# Patient Record
Sex: MALE | Race: WHITE | NOT HISPANIC OR LATINO | Employment: FULL TIME | URBAN - METROPOLITAN AREA
[De-identification: names, ages, dates, MRNs, and addresses within clinical notes are randomized per-mention and may not be internally consistent; named-entity substitution may affect disease eponyms.]

---

## 2017-03-29 ENCOUNTER — OFFICE VISIT (OUTPATIENT)
Dept: NEUROLOGY | Facility: CLINIC | Age: 52
End: 2017-03-29

## 2017-03-29 VITALS
DIASTOLIC BLOOD PRESSURE: 72 MMHG | OXYGEN SATURATION: 99 % | SYSTOLIC BLOOD PRESSURE: 128 MMHG | WEIGHT: 183 LBS | HEIGHT: 69 IN | RESPIRATION RATE: 18 BRPM | BODY MASS INDEX: 27.11 KG/M2

## 2017-03-29 DIAGNOSIS — G40.219 PARTIAL SYMPTOMATIC EPILEPSY WITH COMPLEX PARTIAL SEIZURES, INTRACTABLE, WITHOUT STATUS EPILEPTICUS (HCC): Primary | ICD-10-CM

## 2017-03-29 DIAGNOSIS — F32.A DEPRESSION, UNSPECIFIED DEPRESSION TYPE: ICD-10-CM

## 2017-03-29 PROCEDURE — 95974 PR COMPLEX CRANIAL NEUROSTIM,1ST HOUR: CPT | Performed by: PSYCHIATRY & NEUROLOGY

## 2017-03-29 PROCEDURE — 99214 OFFICE O/P EST MOD 30 MIN: CPT | Performed by: PSYCHIATRY & NEUROLOGY

## 2017-03-29 RX ORDER — ESCITALOPRAM OXALATE 5 MG/1
TABLET ORAL
Qty: 60 TABLET | Refills: 5 | Status: SHIPPED | OUTPATIENT
Start: 2017-03-29 | End: 2018-01-09 | Stop reason: SDUPTHER

## 2017-03-29 NOTE — PROGRESS NOTES
Subjective   Servando Estrada is a 51 y.o. male.     History of Present Illness   Patient followed for several years for seizures starting at about the age of 12. He has had evaluation by Dr. Klein at  including EEG in 2004 showing bitemporal focal slow wave activity more prominent on the left. VEEG showed probable frontal lobe onset. Seizures start with paranoid feeling, then everything sounds loud, then sometimes body movement. Aware with some and not others. Recalls one recently pushed back against couch, and right arm extended up in air. Usually arms or trunk or whole body movement; used to grit teeth. Has some where whole body tenses up, exhausted afterwards. Many in sleep. ?Ever GTC.     Also has common migraines.  Has used PHB, PHT, Tegretol-XR and generic carbamazepine XR. Continues to have seizures despite treatment. He has not tolerated TPM or LVT.  September 2013 started LTG XR to total dose 400mg am. Reported no side effects on LTG ER but prefers taking LTG 200mg bid and LTG ER 50mg 4 qam.  Reported best month probably 5-6 seizures while awake (more in sleep), in a bad month -- many.       April 2014, seizures mainly in sleep, almost every single day. Has been reluctant to try other meds.   model placed 5/7/15. Noted no benefit, so February 2016 planned rapid cycling and slight increase duty cycle: changed off time of 1.1min, on time to 7 seconds. 9/16 reported: Past couple months just total of 2-3 while awake (estimates 30% better), but many at night still. Still noting prolonged sharp pain in his neck with using the magnet. Can still hear hoarseness with stimulation but cannot feel it.  Further increased VNS duty cycle by decreasing off time to 0.8 minutes, no change on time of 7 seconds or current of 1.5mamp,  12/6/16: overall has been doing well, but had one yesterday on way to car. [place] paranoid, then another about 2 hours later, and a few other milder ones, more like aura (no alteration  of consciousness). Was tired, a little sleep deprived, but not ill.   Using magnet is still very painful, finds himself avoiding using it.  Still having problems getting medications from his pharmacy on time even when he orders medication 3 days before he runs out.   Decreased the pulse width of the magnet setting to 250 and he tolerated that well in the office with just some cough but no discomfort. We increased the duty cycle further by decreasing off time to 0.5 minutes.  Today reports seizures at night, maybe stress induced and lack of sleep. More mood swings, depressed and irritable, not severe, no thoughts of self harm. Trouble with judging distance/depth perception; present for several years, but has bumped head twice in past couple weeks. Lots of stress. Work, and worried about partner's health. Has had 2-3 daytime seizures since last seen. `Magnet more painful again, hardly uses 2/2 pain shooting up into neck.    The following portions of the patient's history were reviewed and updated as appropriate: allergies, current medications, past family history, past medical history, past social history, past surgical history and problem list.    Review of Systems   Constitutional: Negative for fever and unexpected weight change.   HENT: Positive for voice change.    Respiratory: Negative for cough, choking and shortness of breath.    Cardiovascular: Negative for chest pain.   Neurological: Positive for seizures.       Objective   Physical Exam   Constitutional: He is oriented to person, place, and time. He appears well-developed and well-nourished.   HENT:   Head: Normocephalic and atraumatic.   Eyes: EOM are normal. Pupils are equal, round, and reactive to light.   Pulmonary/Chest: Effort normal.   Musculoskeletal: Normal range of motion.   Neurological: He is oriented to person, place, and time. He has a normal Finger-Nose-Finger Test and a normal Heel to Shin Test. Gait normal.   Skin: Skin is warm and dry.    Psychiatric: His speech is normal and behavior is normal. Judgment and thought content normal.   Nursing note and vitals reviewed.      Neurologic Exam     Mental Status   Oriented to person, place, and time.   Speech: speech is normal   Level of consciousness: alert  Able to name object. Able to read. Able to write. Normal comprehension.     Cranial Nerves     CN II   Visual fields full to confrontation.     CN III, IV, VI   Pupils are equal, round, and reactive to light.  Extraocular motions are normal.     CN V   Facial sensation intact.     CN IX, X   CN IX normal.     CN XI   CN XI normal.     CN XII   CN XII normal.     Motor Exam   Muscle bulk: normal  Right arm pronator drift: absent  Left arm pronator drift: absent    Gait, Coordination, and Reflexes     Gait  Gait: normal    Coordination   Finger to nose coordination: normal  Heel to shin coordination: normal    Tremor   Resting tremor: absent  Intention tremor: absent  Action tremor: absent      Assessment/Plan   Servando was seen today for seizures.    Diagnoses and all orders for this visit:    Partial symptomatic epilepsy with complex partial seizures, intractable, without status epilepticus    Depression, unspecified depression type    Other orders  -     escitalopram (LEXAPRO) 5 MG tablet; Start one tab daily, and after 3 weeks, increase to 2 tabs if tolerated.    Discussion/Summary:  Not tolerating magnet; VNS settings reviewed, device diagnostics checked. Decreased magnet on time to 21 seconds -- he will try to start swiping twice daily to improve tolerance. We will look into insurance coverage for change to 106, with which we would likely be able to get better effect with lower current settings. Copay could be an issue, needed loan for the first surgery.  For depression, will start lexapro 5mg, up to 10mg. Discussed possible worsening of depression, stop immediately if that occurs; partner also aware.  Return in about 3 months (around 6/29/2017).

## 2017-06-28 ENCOUNTER — OFFICE VISIT (OUTPATIENT)
Dept: NEUROLOGY | Facility: CLINIC | Age: 52
End: 2017-06-28

## 2017-06-28 VITALS
HEIGHT: 69 IN | RESPIRATION RATE: 12 BRPM | WEIGHT: 183 LBS | BODY MASS INDEX: 27.11 KG/M2 | DIASTOLIC BLOOD PRESSURE: 76 MMHG | SYSTOLIC BLOOD PRESSURE: 124 MMHG

## 2017-06-28 DIAGNOSIS — G40.219 PARTIAL SYMPTOMATIC EPILEPSY WITH COMPLEX PARTIAL SEIZURES, INTRACTABLE, WITHOUT STATUS EPILEPTICUS (HCC): Primary | ICD-10-CM

## 2017-06-28 DIAGNOSIS — G43.709 CHRONIC MIGRAINE WITHOUT AURA WITHOUT STATUS MIGRAINOSUS, NOT INTRACTABLE: ICD-10-CM

## 2017-06-28 PROCEDURE — 95970 ALYS NPGT W/O PRGRMG: CPT | Performed by: PSYCHIATRY & NEUROLOGY

## 2017-06-28 PROCEDURE — 99214 OFFICE O/P EST MOD 30 MIN: CPT | Performed by: PSYCHIATRY & NEUROLOGY

## 2017-06-28 RX ORDER — SUMATRIPTAN 100 MG/1
100 TABLET, FILM COATED ORAL ONCE AS NEEDED
Qty: 9 TABLET | Refills: 11 | Status: SHIPPED | OUTPATIENT
Start: 2017-06-28 | End: 2018-01-09 | Stop reason: SDUPTHER

## 2017-06-28 NOTE — PROGRESS NOTES
Subjective   Servando Estrada is a 51 y.o. male.     History of Present Illness   Patient followed for several years for seizures starting at about the age of 12. He has had evaluation by Dr. Klein at  including EEG in 2004 showing bitemporal focal slow wave activity more prominent on the left. VEEG showed probable frontal lobe onset. Seizures start with paranoid feeling, then everything sounds loud, then sometimes body movement. Aware with some and not others. Recalls one where pushed back against couch, and right arm extended up in air. Usually arms or trunk or whole body movement; used to grit teeth. Has some where whole body tenses up, exhausted afterwards. Many in sleep. ?Ever GTC.      Also has common migraines.  Has used PHB, PHT, Tegretol-XR and generic carbamazepine XR. Continues to have seizures despite treatment. He has not tolerated TPM or LVT.  September 2013 started LTG XR to total dose 400mg am. Reported no side effects on LTG ER but prefers taking LTG 200mg bid and LTG ER 50mg 4 qam.  Reported best month probably 5-6 seizures while awake (more in sleep), in a bad month -- many.    Has been reluctant to try other meds.   model placed 5/7/15. Noted no benefit, so February 2016 planned rapid cycling and slight increase duty cycle: changed off time of 1.1min, on time to 7 seconds.   9/16 reported: Past couple months just total of 2-3 while awake (estimates 30% better), but many at night still. Still noting prolonged sharp pain in his neck with using the magnet. Can still hear hoarseness with stimulation but cannot feel it.  Further increased VNS duty cycle by decreasing off time to 0.8 minutes, no change on time of 7 seconds or current of 1.5mamp,  Using magnet is still very painful, finds himself avoiding using it.  Decreased the pulse width of the magnet setting to 250 and he tolerated that well in the office with just some cough but no discomfort. We increased the duty cycle further by  decreasing off time to 0.5 minutes.  3/17: reports seizures at night, maybe stress induced and lack of sleep. More mood swings, depressed and irritable, not severe, no thoughts of self harm. Trouble with judging distance/depth perception; present for several years, but has bumped head twice in past couple weeks. Lots of stress. 2-3 daytime seizures since last seen. Magnet more painful again, hardly uses 2/2 pain shooting up into neck. Decreased magnet on time to 21 seconds -- he will try to start swiping twice daily to improve tolerance. We will look into insurance coverage for change to 106, but copay could be an issue, needed loan for the first surgery.  For depression, will start lexapro 5mg, up to 10mg.  Today: having sporadic daytime seizures, usually when tired. Severity improved for the infrequent seizures -- has not had to leave work or miss work due to seizures. Had the flu couple months ago. Recent toradol for back, and muscle relaxers. Really not using magnet (one day in April, once 2 weeks ago per device). Tried in the office today, mild pain near jaw, tolerable.   Not taking lexapro, mood OK, feeling better with partner's health better.     The following portions of the patient's history were reviewed and updated as appropriate: allergies, current medications, past family history, past medical history, past social history, past surgical history and problem list.    Review of Systems   Constitutional: Negative for unexpected weight change.   Respiratory: Negative for cough and shortness of breath.    Cardiovascular: Negative for chest pain.   Musculoskeletal: Positive for back pain.   Neurological: Positive for seizures.       Objective   Physical Exam   Constitutional: He is oriented to person, place, and time. He appears well-developed and well-nourished.   HENT:   Head: Normocephalic and atraumatic.   Eyes: EOM are normal. Pupils are equal, round, and reactive to light.   Pulmonary/Chest: Effort  normal.   Musculoskeletal: Normal range of motion.   Neurological: He is oriented to person, place, and time. He has a normal Finger-Nose-Finger Test and a normal Heel to Shin Test. Gait normal.   Skin: Skin is warm and dry.   Psychiatric: His speech is normal.   Nursing note and vitals reviewed.      Neurologic Exam     Mental Status   Oriented to person, place, and time.   Speech: speech is normal   Level of consciousness: alert  Knowledge: consistent with education.   Able to name object. Normal comprehension.     Cranial Nerves     CN II   Visual fields full to confrontation.     CN III, IV, VI   Pupils are equal, round, and reactive to light.  Extraocular motions are normal.     CN VII   Facial expression full, symmetric.     CN IX, X   CN IX normal.   CN X normal.   Palate: symmetric    CN XI   CN XI normal.     CN XII   CN XII normal.     Motor Exam   Muscle bulk: normal  Right arm pronator drift: absent  Left arm pronator drift: absent    Strength   Strength 5/5 except as noted.     Sensory Exam   Light touch normal.     Gait, Coordination, and Reflexes     Gait  Gait: normal    Coordination   Finger to nose coordination: normal  Heel to shin coordination: normal    Tremor   Resting tremor: absent  Intention tremor: absent  Action tremor: absent      Assessment/Plan   Servando was seen today for follow-up.    Diagnoses and all orders for this visit:    Partial symptomatic epilepsy with complex partial seizures, intractable, without status epilepticus    Chronic migraine without aura without status migrainosus, not intractable    Other orders  -     SUMAtriptan (IMITREX) 100 MG tablet; Take 1 tablet by mouth 1 (One) Time As Needed for Migraine for up to 1 dose.      Discussion/Summary:  Sz: overall marked improvement with VNS. Would like to be able to abort his now infrequent daytime seizures, since usually gets a warning. Magnet tolerable in office today, so he is agreeable to start using at least once/day.  Next visit plan to increase on time of magnet. Device diagnostics look good, lead impedance normal, battery still all green. Magnet use as above.  Migraine -- refills given of imitrex. Discussed restarting prophylactic medication if remain frequent. Exam benign and no change in character.  25 minutes face to face, 15 minutes spent in discussion of VNS efficacy, setting options, tolerance of magnet, migraine tx.  Return in about 3 months (around 9/28/2017).

## 2017-10-05 ENCOUNTER — OFFICE VISIT (OUTPATIENT)
Dept: NEUROLOGY | Facility: CLINIC | Age: 52
End: 2017-10-05

## 2017-10-05 VITALS
DIASTOLIC BLOOD PRESSURE: 82 MMHG | SYSTOLIC BLOOD PRESSURE: 126 MMHG | HEIGHT: 69 IN | BODY MASS INDEX: 26.07 KG/M2 | WEIGHT: 176 LBS

## 2017-10-05 DIAGNOSIS — G40.219 PARTIAL SYMPTOMATIC EPILEPSY WITH COMPLEX PARTIAL SEIZURES, INTRACTABLE, WITHOUT STATUS EPILEPTICUS (HCC): Primary | ICD-10-CM

## 2017-10-05 PROCEDURE — 95974 PR COMPLEX CRANIAL NEUROSTIM,1ST HOUR: CPT | Performed by: PSYCHIATRY & NEUROLOGY

## 2017-10-05 NOTE — PROGRESS NOTES
Subjective   Servando Estrada is a 52 y.o. male.     History of Present Illness   Patient followed for several years for seizures starting at about the age of 12. Has had evaluation by Dr. Klein at  including EEG in 2004 showing bitemporal focal slow wave activity more prominent on the left. VEEG showed probable frontal lobe onset. Seizures start with paranoid feeling, then everything sounds loud, then sometimes body movement. Aware with some and not others. Recalls one where pushed back against couch, and right arm extended up in air. Usually arms or trunk or whole body movement; used to grit teeth. Has some where whole body tenses up, exhausted afterwards. Many in sleep. ?Ever GTC.      Also has common migraines.  Has used PHB, PHT, Tegretol-XR and generic carbamazepine XR. Continues to have seizures despite treatment. He has not tolerated TPM or LVT.  September 2013 started LTG XR to total dose 400mg am. Reported no side effects on LTG ER but prefers taking LTG 200mg bid and LTG ER 50mg 4 qam.  Reported best month probably 5-6 seizures while awake (more in sleep), in a bad month -- many.    Has been reluctant to try other meds.   model placed 5/7/15. Noted no benefit, so February 2016 planned rapid cycling and slight increase duty cycle: changed off time of 1.1min, on time to 7 seconds.   9/16 reported: Past couple months just total of 2-3 while awake (estimates 30% better), but many at night still. Still noting prolonged sharp pain in his neck with using the magnet. Can still hear hoarseness with stimulation but cannot feel it.  Further increased VNS duty cycle by decreasing off time to 0.8 minutes, no change on time of 7 seconds or current of 1.5mamp,  Using magnet is still very painful, finds himself avoiding using it.  Decreased the pulse width of the magnet setting to 250 and he tolerated that well in the office with just some cough but no discomfort. We increased the duty cycle further by  decreasing off time to 0.5 minutes.  3/17: reports seizures at night, maybe stress induced and lack of sleep. More mood problems, depression, stress. 2-3 daytime seizures since last seen. Magnet more painful again, hardly uses 2/2 pain shooting up into neck. Decreased magnet on time to 21 seconds -- he will try to start swiping twice daily to improve tolerance. We will look into insurance coverage for change to 106, but copay could be an issue, needed loan for the first surgery.  For depression, planned lexapro 5mg, up to 10mg.  6/17: having sporadic daytime seizures, usually when tired. Severity improved for the infrequent seizures -- has not had to leave work or miss work due to seizures.Really not using magnet (one day in April, once 2 weeks ago per device). Tried in the office today, mild pain near jaw, tolerable. Planned to try to habituate.  Not taking lexapro, mood OK, feeling better with partner's health better.   Today: still a painful twinge with magnet, so hasn't used much (several times soon after last seen, none in past 6 weeks). Seizures pretty good overall -- 2-3 daytime sz in over 3 months, not many nocturnal either. Not leaving work 2/2 sz. Same triggers -- eg fatigue.    The following portions of the patient's history were reviewed and updated as appropriate: allergies, current medications, past family history, past medical history, past social history, past surgical history and problem list.    Review of Systems   Constitutional: Negative for fever and unexpected weight change.   Respiratory: Negative for cough and shortness of breath.    Cardiovascular: Negative for chest pain.       Objective   Physical Exam   Constitutional: He is oriented to person, place, and time. He appears well-developed and well-nourished.   HENT:   Head: Normocephalic and atraumatic.   Eyes: EOM are normal. Pupils are equal, round, and reactive to light.   Pulmonary/Chest: Effort normal.   Musculoskeletal: Normal range  "of motion.   Neurological: He is oriented to person, place, and time. He has normal strength. Gait normal.   Skin: Skin is warm and dry.   Nursing note and vitals reviewed.      Neurologic Exam     Mental Status   Oriented to person, place, and time.   Attention: normal.   Level of consciousness: alert  Knowledge: good.   Able to name object. Able to read. Able to repeat. Able to write. Normal comprehension.     Cranial Nerves     CN III, IV, VI   Pupils are equal, round, and reactive to light.  Extraocular motions are normal.     CN VII   Facial expression full, symmetric.     CN IX, X   CN IX normal.   CN X normal.     CN XI   CN XI normal.     CN XII   CN XII normal.     Motor Exam   Muscle bulk: normal  Overall muscle tone: normal    Strength   Strength 5/5 throughout.     Gait, Coordination, and Reflexes     Gait  Gait: normal    Tremor   Resting tremor: absent  Intention tremor: absent  Action tremor: absent      Assessment/Plan   Servando was seen today for neurologic problem.    Diagnoses and all orders for this visit:    Partial symptomatic epilepsy with complex partial seizures, intractable, without status epilepticus    Discussion/Summary:  VNS programmin seconds causes painful twinge in neck with magnet at , so decreased to 130, increased magnet on time back to 60 seconds. No trouble whatsoever. Device diagnostics look good, levels recorded. No change in normal mode (1.5m amp, , on 7 sec off 0.8 minutes), battery full green.  Overall doing well with seizures with VNS, but no \"rescue\" available from magnet until now due to hesitation to use it. Completely tolerable now, so gave extra magnet and discussed ways to remember to use it, at least twice/day if possible, so we can increase PW next visit into more effective range.  Return in about 4 weeks (around 2017).  "

## 2018-01-09 RX ORDER — LAMOTRIGINE 50 MG/1
TABLET, EXTENDED RELEASE ORAL
Qty: 135 TABLET | Refills: 11 | Status: SHIPPED | OUTPATIENT
Start: 2018-01-09 | End: 2018-01-24 | Stop reason: SDUPTHER

## 2018-01-09 RX ORDER — LAMOTRIGINE 200 MG/1
TABLET ORAL
Qty: 75 TABLET | Refills: 11 | Status: SHIPPED | OUTPATIENT
Start: 2018-01-09 | End: 2018-01-24 | Stop reason: SDUPTHER

## 2018-01-09 RX ORDER — ESCITALOPRAM OXALATE 5 MG/1
TABLET ORAL
Qty: 60 TABLET | Refills: 5 | Status: SHIPPED | OUTPATIENT
Start: 2018-01-09 | End: 2018-05-24

## 2018-01-09 RX ORDER — SUMATRIPTAN 100 MG/1
100 TABLET, FILM COATED ORAL ONCE AS NEEDED
Qty: 9 TABLET | Refills: 11 | Status: SHIPPED | OUTPATIENT
Start: 2018-01-09

## 2018-01-09 RX ORDER — CARBAMAZEPINE 200 MG/1
TABLET, EXTENDED RELEASE ORAL
Qty: 135 TABLET | Refills: 11 | Status: SHIPPED | OUTPATIENT
Start: 2018-01-09 | End: 2018-01-24 | Stop reason: SDUPTHER

## 2018-01-24 ENCOUNTER — OFFICE VISIT (OUTPATIENT)
Dept: NEUROLOGY | Facility: CLINIC | Age: 53
End: 2018-01-24

## 2018-01-24 VITALS
SYSTOLIC BLOOD PRESSURE: 118 MMHG | DIASTOLIC BLOOD PRESSURE: 66 MMHG | HEIGHT: 69 IN | BODY MASS INDEX: 25.92 KG/M2 | WEIGHT: 175 LBS

## 2018-01-24 DIAGNOSIS — G40.219 PARTIAL SYMPTOMATIC EPILEPSY WITH COMPLEX PARTIAL SEIZURES, INTRACTABLE, WITHOUT STATUS EPILEPTICUS (HCC): Primary | ICD-10-CM

## 2018-01-24 PROCEDURE — 99213 OFFICE O/P EST LOW 20 MIN: CPT | Performed by: PSYCHIATRY & NEUROLOGY

## 2018-01-24 PROCEDURE — 95974 PR COMPLEX CRANIAL NEUROSTIM,1ST HOUR: CPT | Performed by: PSYCHIATRY & NEUROLOGY

## 2018-01-24 RX ORDER — LAMOTRIGINE 50 MG/1
TABLET, EXTENDED RELEASE ORAL
Qty: 150 TABLET | Refills: 11 | Status: SHIPPED | OUTPATIENT
Start: 2018-01-24 | End: 2019-01-10 | Stop reason: SDUPTHER

## 2018-01-24 RX ORDER — IBUPROFEN 600 MG/1
600 TABLET ORAL EVERY 6 HOURS PRN
COMMUNITY

## 2018-01-24 RX ORDER — CARBAMAZEPINE 200 MG/1
TABLET, EXTENDED RELEASE ORAL
Qty: 150 TABLET | Refills: 11 | Status: SHIPPED | OUTPATIENT
Start: 2018-01-24 | End: 2019-02-21 | Stop reason: SDUPTHER

## 2018-01-24 RX ORDER — LAMOTRIGINE 200 MG/1
TABLET ORAL
Qty: 80 TABLET | Refills: 11 | Status: SHIPPED | OUTPATIENT
Start: 2018-01-24 | End: 2018-05-24 | Stop reason: SDUPTHER

## 2018-01-24 NOTE — PROGRESS NOTES
Subjective   Servando Estrada is a 52 y.o. male.     Chief Complaint   Patient presents with   • Follow-up     no complaints       History of Present Illness   Patient followed for several years for epilepsy starting around age 12. Has had evaluation by Dr. Klein at  including EEG in 2004 showing bitemporal focal slow wave activity more prominent on the left. VEEG showed probable frontal lobe onset. Seizures start with paranoid feeling, then everything sounds loud, then sometimes body movement. Aware with some and not others. Recalls one where pushed back against couch, and right arm extended up in air. Usually arms or trunk or whole body movement; used to grit teeth. Has some where whole body tenses up, exhausted afterwards. Many in sleep. ?Ever GTC.      Also has common migraines.  Has used PHB, PHT, Tegretol-XR and generic carbamazepine XR, has not tolerated TPM or LVT.  9/13 started LTG XR to total dose 400mg am. Reported no side effects on LTG ER but prefers taking LTG 200mg bid and LTG ER 50mg 4 qam.  Reported best month probably 5-6 seizures while awake (more in sleep), in a bad month -- many.     model placed 5/15. Noted no benefit, so 2/16 changed to rapid cycling, off time 1.1min, on time to 7 seconds.   9/16 reported: Past couple months just total of 2-3 while awake (estimates 30% better), but many at night still. Still noting prolonged sharp pain in his neck with using the magnet.  Further increased VNS duty cycle by decreasing off time to 0.8 minutes, no change on time of 7 seconds or current of 1.5mamp,  Using magnet is still very painful, avoids using it.  Decreased the pulse width of the magnet setting to 250 and he tolerated that well in the office with just some cough but no discomfort. We increased the duty cycle further by decreasing off time to 0.5 minutes.  3/17: reports seizures at night, 2-3 daytime seizures since last seen. Magnet more painful again, decreased magnet on time to  21 seconds.  For depression, planned lexapro.  6/17: having sporadic daytime seizures, usually when tired. Severity improved for the infrequent seizures -- has not had to leave work or miss work due to seizures. Not taking lexapro, mood OK, feeling better with partner's health better.   10/17: Seizures pretty good overall -- 2-3 daytime sz in over 3 months, not many nocturnal either. Not leaving work 2/2 sz. Same triggers -- eg fatigue.  21 seconds causes painful twinge in neck with magnet at , so decreased to 130, increased magnet on time back to 60 seconds. Normal mode 1.5m amp, , on 7 sec off 0.8 minutes, battery full green.  Today: using magnet, seems to help.    Probably doing better. Had 2 seizures because pharmacy didn't have his meds, actually ran out of medication, was tired, and had a couple. Has tried going in person, phoning, using online system and cannot consistently get his refills. Health has been ok otherwise.    The following portions of the patient's history were reviewed and updated as appropriate: allergies, current medications, past family history, past medical history, past social history, past surgical history and problem list.    No Known Allergies    Current Outpatient Prescriptions on File Prior to Visit   Medication Sig Dispense Refill   • escitalopram (LEXAPRO) 5 MG tablet Start one tab daily, and after 3 weeks, increase to 2 tabs if tolerated. 60 tablet 5   • SUMAtriptan (IMITREX) 100 MG tablet Take 1 tablet by mouth 1 (One) Time As Needed for Migraine for up to 1 dose. 9 tablet 11   • [DISCONTINUED] carBAMazepine XR (TEGretol  XR) 200 MG 12 hr tablet Take two pills twice daily, may take extra pill as instructed 135 tablet 11   • [DISCONTINUED] lamoTRIgine (LaMICtal) 200 MG tablet Take one pill twice daily, may take extra pill as instructed. 75 tablet 11   • [DISCONTINUED] LamoTRIgine ER 50 MG tablet sustained-release 24 hour Take 4 tabs daily each morning in addition to  "regular lamotrigine, may take extra pill as instructed. 135 tablet 11     No current facility-administered medications on file prior to visit.        Past Medical History:   Diagnosis Date   • Complex partial seizure    • Headache, migraine        No past surgical history on file.    Social History     Social History   • Marital status: Single     Spouse name: N/A   • Number of children: N/A   • Years of education: N/A     Occupational History   • Not on file.     Social History Main Topics   • Smoking status: Current Every Day Smoker   • Smokeless tobacco: Not on file   • Alcohol use No   • Drug use: Not on file   • Sexual activity: Not on file     Other Topics Concern   • Not on file     Social History Narrative       Review of Systems   Constitutional: Negative for fever and unexpected weight change.   Respiratory: Negative for cough and shortness of breath.    Cardiovascular: Negative for chest pain.       Objective   Blood pressure 118/66, height 175.3 cm (69\"), weight 79.4 kg (175 lb).    Physical Exam   Constitutional: He is oriented to person, place, and time. He appears well-developed and well-nourished.   HENT:   Head: Normocephalic and atraumatic.   Eyes: EOM are normal.   Pulmonary/Chest: Effort normal.   Musculoskeletal: Normal range of motion.   Neurological: He is oriented to person, place, and time. He has normal strength. Gait normal.   Skin: Skin is warm and dry.   Psychiatric: His speech is normal.   Nursing note and vitals reviewed.      Neurologic Exam     Mental Status   Oriented to person, place, and time.   Speech: speech is normal   Level of consciousness: alert    Cranial Nerves     CN III, IV, VI   Extraocular motions are normal.     CN VII   Facial expression full, symmetric.     CN IX, X   CN IX normal.     CN XI   CN XI normal.     CN XII   CN XII normal.     Motor Exam     Strength   Strength 5/5 throughout.     Gait, Coordination, and Reflexes     Gait  Gait: " normal      Assessment/Plan     Servando was seen today for follow-up.    Diagnoses and all orders for this visit:    Partial symptomatic epilepsy with complex partial seizures, intractable, without status epilepticus    Other orders  -     carBAMazepine XR (TEGretol  XR) 200 MG 12 hr tablet; Take two pills twice daily, may take extra pill as instructed  -     lamoTRIgine (LaMICtal) 200 MG tablet; Take one pill twice daily, may take extra pill as instructed.  -     LamoTRIgine ER 50 MG tablet sustained-release 24 hour; Take 4 tabs daily each morning in addition to regular lamotrigine, may take extra pill as instructed.        Discussion/Summary:  Long discussion today regarding magnet use, differences between the 105 and 106 models, prognosis with VNS over time, including effect not just on seizures but postictal. etc., medications, and made plan for extra pills for a buffer when he his pharmacy does not have his medications.  We tried increase the magnet pulse width back to 250, and he initially felt this was tolerable but after some minutes coughing was too much and we decreased it back to 130.  Device diagnostics looked good, battery full green, impedance okay and leads looked good.  30 minutes face-to-face, 20 minutes spent in discussion as above.  Return in about 4 months (around 5/24/2018).    There are no Patient Instructions on file for this visit.

## 2018-02-12 RX ORDER — LAMOTRIGINE 200 MG/1
TABLET ORAL
Qty: 75 TABLET | Refills: 11 | Status: SHIPPED | OUTPATIENT
Start: 2018-02-12 | End: 2020-03-23 | Stop reason: SDUPTHER

## 2018-05-24 ENCOUNTER — OFFICE VISIT (OUTPATIENT)
Dept: NEUROLOGY | Facility: CLINIC | Age: 53
End: 2018-05-24

## 2018-05-24 VITALS
HEIGHT: 69 IN | WEIGHT: 178 LBS | SYSTOLIC BLOOD PRESSURE: 118 MMHG | DIASTOLIC BLOOD PRESSURE: 72 MMHG | BODY MASS INDEX: 26.36 KG/M2

## 2018-05-24 DIAGNOSIS — G40.219 PARTIAL SYMPTOMATIC EPILEPSY WITH COMPLEX PARTIAL SEIZURES, INTRACTABLE, WITHOUT STATUS EPILEPTICUS (HCC): Primary | ICD-10-CM

## 2018-05-24 DIAGNOSIS — G43.709 CHRONIC MIGRAINE WITHOUT AURA WITHOUT STATUS MIGRAINOSUS, NOT INTRACTABLE: ICD-10-CM

## 2018-05-24 PROCEDURE — 95974 PR COMPLEX CRANIAL NEUROSTIM,1ST HOUR: CPT | Performed by: PSYCHIATRY & NEUROLOGY

## 2018-05-24 NOTE — PROGRESS NOTES
Subjective   Servando Estrada is a 52 y.o. male.     Chief Complaint   Patient presents with   • Partial symptomatic epilepsy with complex partial seizures,    • VNS       History of Present Illness     Patient followed for several years for epilepsy starting around age 12. Has had evaluation by Dr. Klein at  including EEG in 2004 showing bitemporal focal slow wave activity more prominent on the left. VEEG showed probable frontal lobe onset. Seizures start with paranoid feeling, then everything sounds loud, then sometimes body movement. Aware with some and not others. Recalls one where pushed back against couch, and right arm extended up in air. Usually arms or trunk or whole body movement; used to grit teeth. Has some where whole body tenses up, exhausted afterwards. Many in sleep. ?Ever GTC.      Also has common migraines.  Has used PHB, PHT, Tegretol-XR and generic carbamazepine XR, has not tolerated TPM or LVT.  9/13 started LTG XR to total dose 400mg am. Reported no side effects on LTG ER but prefers taking LTG 200mg bid and LTG ER 50mg 4 qam.  Reported best month probably 5-6 seizures while awake (more in sleep), in a bad month -- many.     model placed 5/15. Noted no benefit, so 2/16 changed to rapid cycling, off time 1.1min, on time to 7 seconds.   9/16 reported: Past couple months just total of 2-3 while awake (estimates 30% better), but many at night still. Still noting prolonged sharp pain in his neck with using the magnet.  Further increased VNS duty cycle by decreasing off time to 0.8 minutes, no change on time of 7 seconds or current of 1.5mamp,  Using magnet is still very painful, avoids using it.  Decreased the pulse width of the magnet setting to 250 and he tolerated that well in the office with just some cough but no discomfort. We increased the duty cycle further by decreasing off time to 0.5 minutes.  6/17: having sporadic daytime seizures, usually when tired. Severity improved for  the infrequent seizures -- has not had to leave work or miss work due to seizures. Not taking lexapro, mood OK, feeling better with partner's health better.   10/17: Seizures pretty good overall -- 2-3 daytime sz in over 3 months, not many nocturnal either. Not leaving work 2/2 sz. Same triggers -- eg fatigue.  21 seconds causes painful twinge in neck with magnet at , so decreased to 130, increased magnet on time back to 60 seconds. Normal mode 1.5m amp, , on 7 sec off 0.8 minutes, battery full green.  1/18: using magnet, seems to help.    Probably doing better. Had 2 seizures because pharmacy didn't have his meds, actually ran out of medication, was tired, and had a couple. Has tried going in person, phoning, using online system and cannot consistently get his refills. Health has been ok otherwise.  Today: has been using the magnet to get used to it, and more tolerable. Has had aura a few times lately, lots of stress with partner's health. Magnet usually but does not always work.   Not many migraines.      No Known Allergies    Current Outpatient Prescriptions on File Prior to Visit   Medication Sig Dispense Refill   • carBAMazepine XR (TEGretol  XR) 200 MG 12 hr tablet Take two pills twice daily, may take extra pill as instructed 150 tablet 11   • ibuprofen (ADVIL,MOTRIN) 600 MG tablet Take 600 mg by mouth Every 6 (Six) Hours As Needed for Mild Pain .     • lamoTRIgine (LaMICtal) 200 MG tablet TAKE ONE TABLET BY MOUTH TWICE DAILY, MAY  TAKE  EXTRA  PILL  AS  INSTRUCTED 75 tablet 11   • LamoTRIgine ER 50 MG tablet sustained-release 24 hour Take 4 tabs daily each morning in addition to regular lamotrigine, may take extra pill as instructed. 150 tablet 11   • SUMAtriptan (IMITREX) 100 MG tablet Take 1 tablet by mouth 1 (One) Time As Needed for Migraine for up to 1 dose. 9 tablet 11   • [DISCONTINUED] escitalopram (LEXAPRO) 5 MG tablet Start one tab daily, and after 3 weeks, increase to 2 tabs if tolerated.  "60 tablet 5   • [DISCONTINUED] lamoTRIgine (LaMICtal) 200 MG tablet Take one pill twice daily, may take extra pill as instructed. 80 tablet 11     No current facility-administered medications on file prior to visit.        Past Medical History:   Diagnosis Date   • Complex partial seizure    • Headache, migraine        No past surgical history on file.    Social History     Social History   • Marital status: Single     Spouse name: N/A   • Number of children: N/A   • Years of education: N/A     Occupational History   • Not on file.     Social History Main Topics   • Smoking status: Current Every Day Smoker   • Smokeless tobacco: Not on file   • Alcohol use No   • Drug use: Unknown   • Sexual activity: Not on file     Other Topics Concern   • Not on file     Social History Narrative   • No narrative on file       Review of Systems   Constitutional: Negative for fever and unexpected weight change.   Respiratory: Negative for cough and shortness of breath.    Cardiovascular: Negative for chest pain.       Objective   Blood pressure 118/72, height 175.3 cm (69\"), weight 80.7 kg (178 lb).    Physical Exam   Constitutional: He is oriented to person, place, and time. He appears well-developed and well-nourished.   HENT:   Head: Normocephalic and atraumatic.   Eyes: EOM are normal. Pupils are equal, round, and reactive to light.   Neck: Neck supple.   Cardiovascular: Normal rate, regular rhythm and normal heart sounds.    Pulmonary/Chest: Effort normal.   Musculoskeletal: Normal range of motion.   Neurological: He is oriented to person, place, and time. He has a normal Finger-Nose-Finger Test and a normal Heel to Shin Test. Gait normal.   Skin: Skin is warm and dry.   Psychiatric: His speech is normal and behavior is normal. Thought content normal.       Neurologic Exam     Mental Status   Oriented to person, place, and time.   Speech: speech is normal   Level of consciousness: alert  Knowledge: consistent with education. "   Able to name object. Normal comprehension.     Cranial Nerves     CN II   Visual fields full to confrontation.     CN III, IV, VI   Pupils are equal, round, and reactive to light.  Extraocular motions are normal.     CN VII   Facial expression full, symmetric.     CN IX, X   CN IX normal.   CN X normal.   Palate: symmetric    CN XI   CN XI normal.     CN XII   CN XII normal.     Motor Exam   Muscle bulk: normal  Right arm pronator drift: absent  Left arm pronator drift: absent    Strength   Strength 5/5 except as noted.     Sensory Exam   Light touch normal.     Gait, Coordination, and Reflexes     Gait  Gait: normal    Coordination   Finger to nose coordination: normal  Heel to shin coordination: normal    Tremor   Resting tremor: absent  Intention tremor: absent  Action tremor: absent      Assessment/Plan     Servando was seen today for partial symptomatic epilepsy with complex partial seizures,  and vns.    Diagnoses and all orders for this visit:    Partial symptomatic epilepsy with complex partial seizures, intractable, without status epilepticus    Chronic migraine without aura without status migrainosus, not intractable      Discussion/Summary:  Increased magnet to 250PW, checked device diagnostics, no change in current of 1.5, magnet 1.75. Some coughing, lacrimation with higher PW, but he reports tolerable, so will leave at higher setting.   No change meds.  Return in about 3 months (around 8/24/2018).

## 2018-08-16 ENCOUNTER — OFFICE VISIT (OUTPATIENT)
Dept: NEUROLOGY | Facility: CLINIC | Age: 53
End: 2018-08-16

## 2018-08-16 VITALS
SYSTOLIC BLOOD PRESSURE: 124 MMHG | DIASTOLIC BLOOD PRESSURE: 80 MMHG | BODY MASS INDEX: 25.92 KG/M2 | WEIGHT: 175 LBS | HEIGHT: 69 IN

## 2018-08-16 DIAGNOSIS — G40.219 PARTIAL SYMPTOMATIC EPILEPSY WITH COMPLEX PARTIAL SEIZURES, INTRACTABLE, WITHOUT STATUS EPILEPTICUS (HCC): Primary | ICD-10-CM

## 2018-08-16 PROCEDURE — 99214 OFFICE O/P EST MOD 30 MIN: CPT | Performed by: PSYCHIATRY & NEUROLOGY

## 2018-08-16 NOTE — PROGRESS NOTES
Subjective   Servando Estrada is a 53 y.o. male.     Chief Complaint   Patient presents with   • Seizures       History of Present Illness     Patient followed for several years for epilepsy starting around age 12. Has had evaluation by Dr. Klein at  including EEG in 2004 showing bitemporal focal slow wave activity more prominent on the left. VEEG showed probable frontal lobe onset. Seizures start with paranoid feeling, then everything sounds loud, then sometimes body movement. Aware with some and not others. Recalls one where pushed back against couch, and right arm extended up in air. Usually arms or trunk or whole body movement; used to grit teeth. Has some where whole body tenses up, exhausted afterwards. Many in sleep. ?Ever GTC.      Also has common migraines.  Has used PHB, PHT, Tegretol-XR and generic carbamazepine XR, has not tolerated TPM or LVT.  9/13 started LTG XR to total dose 400mg am. Reported no side effects on LTG ER but prefers taking LTG 200mg bid and LTG ER 50mg 4 qam.  Reported best month probably 5-6 seizures while awake (more in sleep), in a bad month -- many.     model placed 5/15. Noted no benefit, so 2/16 changed to rapid cycling, off time 1.1min, on time to 7 seconds.   9/16 reported: Past couple months just total of 2-3 while awake (estimates 30% better), but many at night still. Still noting prolonged sharp pain in his neck with using the magnet.  Further increased VNS duty cycle by decreasing off time to 0.8 minutes, no change on time of 7 seconds or current of 1.5mamp. Magnet still very painful, avoids using it.  Decreased magnet PW to 250 and tolerated that well in the office.   6/17: having sporadic daytime seizures, usually when tired. Severity improved for the infrequent seizures -- has not had to leave work or miss work due to seizures.  10/17: Seizures pretty good overall -- 2-3 daytime sz in over 3 months, not many nocturnal either. Not leaving work 2/2 sz. Same  triggers -- eg fatigue.  Painful twinge in neck with magnet , so decreased to 130, increased magnet on time back to 60 seconds. Normal mode 1.5m amp, , on 7 sec off 0.8 minutes, battery full green.  1/18: using magnet, seems to help. Probably doing better. Had 2 seizures because pharmacy didn't have his meds, ran out. Has tried going in person, phoning, using online system and cannot consistently get his refills. Increased pill number for back up.  5/18: has been using the magnet to get used to it, and more tolerable. Has had aura a few times lately, lots of stress with partner's health. Magnet usually but does not always work. Not many migraines.  Increased magnet to 250PW, checked device diagnostics, no change in current of 1.5, magnet 1.75.   Today: doing well, one seizure he can think of in past couple mos. Changed dosing after forgot afternoon doses one day: taking only morning meds: CBZ XR 200mg 2 tabs, LTG 200mg and LTG ER 50mgx4. When he gets home from work, if feels sz coming, will take an extra CBZ XR, tolerates it well, and uses magnet if it continues. Believes no increase in night time szs. Using the magnet, still gets twinge and eye runs, but tolerable.   Ran through deductible,  [place]ill now on his insurance.     No Known Allergies    Current Outpatient Prescriptions on File Prior to Visit   Medication Sig Dispense Refill   • carBAMazepine XR (TEGretol  XR) 200 MG 12 hr tablet Take two pills twice daily, may take extra pill as instructed 150 tablet 11   • ibuprofen (ADVIL,MOTRIN) 600 MG tablet Take 600 mg by mouth Every 6 (Six) Hours As Needed for Mild Pain .     • lamoTRIgine (LaMICtal) 200 MG tablet TAKE ONE TABLET BY MOUTH TWICE DAILY, MAY  TAKE  EXTRA  PILL  AS  INSTRUCTED 75 tablet 11   • LamoTRIgine ER 50 MG tablet sustained-release 24 hour Take 4 tabs daily each morning in addition to regular lamotrigine, may take extra pill as instructed. 150 tablet 11   • SUMAtriptan (IMITREX) 100  "MG tablet Take 1 tablet by mouth 1 (One) Time As Needed for Migraine for up to 1 dose. 9 tablet 11     No current facility-administered medications on file prior to visit.        Past Medical History:   Diagnosis Date   • Complex partial seizure (CMS/HCC)    • Headache, migraine        No past surgical history on file.    Social History     Social History   • Marital status: Single     Spouse name: N/A   • Number of children: N/A   • Years of education: N/A     Occupational History   • Not on file.     Social History Main Topics   • Smoking status: Current Every Day Smoker   • Smokeless tobacco: Not on file   • Alcohol use No   • Drug use: Unknown   • Sexual activity: Not on file     Other Topics Concern   • Not on file     Social History Narrative   • No narrative on file       Review of Systems   Constitutional: Negative for fever and unexpected weight change.   Respiratory: Negative for cough and shortness of breath.    Cardiovascular: Negative for chest pain.       Objective   Blood pressure 124/80, height 175.3 cm (69\"), weight 79.4 kg (175 lb).    Physical Exam   Constitutional: He is oriented to person, place, and time. He appears well-developed and well-nourished.   HENT:   Head: Normocephalic and atraumatic.   Eyes: Pupils are equal, round, and reactive to light. EOM are normal.   Pulmonary/Chest: Effort normal.   Neurological: He is oriented to person, place, and time. He has a normal Finger-Nose-Finger Test and a normal Heel to Shin Test. Gait normal.   Skin: Skin is warm and dry.   Psychiatric: He has a normal mood and affect. His speech is normal and behavior is normal. Thought content normal.   Nursing note and vitals reviewed.      Neurologic Exam     Mental Status   Oriented to person, place, and time.   Speech: speech is normal   Level of consciousness: alert  Knowledge: consistent with education.   Able to name object. Normal comprehension.     Cranial Nerves     CN II   Visual fields full to " confrontation.     CN III, IV, VI   Pupils are equal, round, and reactive to light.  Extraocular motions are normal.     CN VII   Facial expression full, symmetric.     CN IX, X   CN IX normal.   CN X normal.   Palate: symmetric    CN XI   CN XI normal.     CN XII   CN XII normal.     Motor Exam   Muscle bulk: normal  Right arm pronator drift: absent  Left arm pronator drift: absent    Strength   Strength 5/5 except as noted.     Sensory Exam   Light touch normal.     Gait, Coordination, and Reflexes     Gait  Gait: normal    Coordination   Finger to nose coordination: normal  Heel to shin coordination: normal    Tremor   Resting tremor: absent  Intention tremor: absent  Action tremor: absent      Assessment/Plan     Servando was seen today for seizures.    Diagnoses and all orders for this visit:    Partial symptomatic epilepsy with complex partial seizures, intractable, without status epilepticus (CMS/[facility])      Discussion/Summary:  .I spent  28  minutes face to face with the patient, with 15 minutes spent  counselling:  Overall clear improvement with VNS, with fewer seizures despite lower doses of meds; but with nocturnal seizures at least, would expect better control with autostim option of 106 model. Discussed considering replacement now, but he feels he will again go through deductible next year, and not keen to replace now. Checked VNS today, numbers correct, device diagnostics fine, battery all green. Next visit will check levels for baseline (pt declines today). Discussed rationale.  Return in about 3 months (around 11/16/2018).

## 2018-12-11 ENCOUNTER — OFFICE VISIT (OUTPATIENT)
Dept: NEUROLOGY | Facility: CLINIC | Age: 53
End: 2018-12-11

## 2018-12-11 ENCOUNTER — LAB (OUTPATIENT)
Dept: LAB | Facility: HOSPITAL | Age: 53
End: 2018-12-11

## 2018-12-11 VITALS
BODY MASS INDEX: 25.92 KG/M2 | SYSTOLIC BLOOD PRESSURE: 129 MMHG | DIASTOLIC BLOOD PRESSURE: 79 MMHG | WEIGHT: 175 LBS | HEIGHT: 69 IN

## 2018-12-11 DIAGNOSIS — G40.219 PARTIAL SYMPTOMATIC EPILEPSY WITH COMPLEX PARTIAL SEIZURES, INTRACTABLE, WITHOUT STATUS EPILEPTICUS (HCC): Primary | ICD-10-CM

## 2018-12-11 DIAGNOSIS — G40.219 PARTIAL SYMPTOMATIC EPILEPSY WITH COMPLEX PARTIAL SEIZURES, INTRACTABLE, WITHOUT STATUS EPILEPTICUS (HCC): ICD-10-CM

## 2018-12-11 LAB — CARBAMAZEPINE SERPL-MCNC: 6.2 MCG/ML (ref 4–10)

## 2018-12-11 PROCEDURE — 95970 ALYS NPGT W/O PRGRMG: CPT | Performed by: PSYCHIATRY & NEUROLOGY

## 2018-12-11 PROCEDURE — 80156 ASSAY CARBAMAZEPINE TOTAL: CPT

## 2018-12-11 PROCEDURE — 80175 DRUG SCREEN QUAN LAMOTRIGINE: CPT

## 2018-12-11 PROCEDURE — 36415 COLL VENOUS BLD VENIPUNCTURE: CPT

## 2018-12-11 PROCEDURE — 99214 OFFICE O/P EST MOD 30 MIN: CPT | Performed by: PSYCHIATRY & NEUROLOGY

## 2018-12-11 RX ORDER — INFLUENZA A VIRUS A/MICHIGAN/45/2015 X-275 (H1N1) ANTIGEN (FORMALDEHYDE INACTIVATED), INFLUENZA A VIRUS A/SINGAPORE/INFIMH-16-0019/2016 IVR-186 (H3N2) ANTIGEN (FORMALDEHYDE INACTIVATED), INFLUENZA B VIRUS B/PHUKET/3073/2013 ANTIGEN (FORMALDEHYDE INACTIVATED), AND INFLUENZA B VIRUS B/MARYLAND/15/2016 BX-69A ANTIGEN (FORMALDEHYDE INACTIVATED) 15; 15; 15; 15 UG/.5ML; UG/.5ML; UG/.5ML; UG/.5ML
INJECTION, SUSPENSION INTRAMUSCULAR
COMMUNITY
Start: 2018-12-05 | End: 2018-12-11

## 2018-12-11 NOTE — PROGRESS NOTES
Subjective   Servando Estrada is a 53 y.o. male.     Chief Complaint   Patient presents with   • Seizures     fu       History of Present Illness     Patient followed for several years for epilepsy starting around age 12. Has had evaluation by Dr. Klein at  including EEG in 2004 showing bitemporal focal slow wave activity more prominent on the left. VEEG showed probable frontal lobe onset. Seizures start with paranoid feeling, then everything sounds loud, then sometimes body movement. Aware with some and not others. Recalls one where pushed back against couch, and right arm extended up in air. Usually arms or trunk or whole body movement; used to grit teeth. Has some where whole body tenses up, exhausted afterwards. Many in sleep.       Also has common migraines.  Has used PHB, PHT, Tegretol-XR and generic carbamazepine XR, has not tolerated TPM or LVT.  9/13 started LTG XR to total dose 400mg am. Reported no side effects on LTG ER but prefers taking LTG 200mg bid and LTG ER 50mg 4 qam.  Reported best month probably 5-6 seizures while awake (more in sleep), in a bad month -- many.     model placed 5/15. Noted no benefit, so 2/16 changed to rapid cycling, off time 1.1min, on time to 7 seconds.   9/16 Past couple months just total of 2-3 while awake (estimates 30% better), but many at night. Still neck pain with magnet.  Decreased off time to 0.8 minutes, no change on time of 7 seconds or current of 1.5mamp.   Decreased magnet PW to 250 and tolerated that well in the office.   10/17: Seizures pretty good overall -- 2-3 daytime sz in over 3 months, not many nocturnal either. Painful twinge in neck with magnet , so decreased to 130, increased magnet on time back to 60 seconds. Normal mode 1.5m amp, , on 7 sec off 0.8 minutes.  5/18: has been using the magnet to get used to it, and more tolerable. Magnet usually but does not always work. Increased magnet to 250PW.   8/18: doing well, one seizure he  can think of in past couple mos. Changed dosing after forgot afternoon doses one day: taking only morning meds: CBZ XR 200mg 2 tabs, LTG 200mg and LTG ER 50mgx4. When he gets home from work, if feels sz coming, will take an extra CBZ XR, tolerates it well, and uses magnet if it continues. Believes no increase in night time szs. Using the magnet, still gets twinge and eye runs, but tolerable.   Today: feels near seizures in evenings eg 3-4x/mo (phi around Black Friday), takes extra CBZ pill, no LOC. No missed days of work. No known nocturnal seizures. Magnet use still causes cough, mild twinge. Took dose of meds about 6:30 this am.   No headaches lately. Has a cold, o/w well.    No Known Allergies    Current Outpatient Medications on File Prior to Visit   Medication Sig Dispense Refill   • carBAMazepine XR (TEGretol  XR) 200 MG 12 hr tablet Take two pills twice daily, may take extra pill as instructed 150 tablet 11   • ibuprofen (ADVIL,MOTRIN) 600 MG tablet Take 600 mg by mouth Every 6 (Six) Hours As Needed for Mild Pain .     • lamoTRIgine (LaMICtal) 200 MG tablet TAKE ONE TABLET BY MOUTH TWICE DAILY, MAY  TAKE  EXTRA  PILL  AS  INSTRUCTED 75 tablet 11   • LamoTRIgine ER 50 MG tablet sustained-release 24 hour Take 4 tabs daily each morning in addition to regular lamotrigine, may take extra pill as instructed. 150 tablet 11   • SUMAtriptan (IMITREX) 100 MG tablet Take 1 tablet by mouth 1 (One) Time As Needed for Migraine for up to 1 dose. 9 tablet 11   • [DISCONTINUED] FLUZONE QUADRIVALENT 0.5 ML suspension prefilled syringe injection        No current facility-administered medications on file prior to visit.        Past Medical History:   Diagnosis Date   • Complex partial seizure (CMS/HCC)    • Headache, migraine        No past surgical history on file.    Social History     Socioeconomic History   • Marital status: Single     Spouse name: Not on file   • Number of children: Not on file   • Years of education: Not  "on file   • Highest education level: Not on file   Social Needs   • Financial resource strain: Not on file   • Food insecurity - worry: Not on file   • Food insecurity - inability: Not on file   • Transportation needs - medical: Not on file   • Transportation needs - non-medical: Not on file   Occupational History   • Not on file   Tobacco Use   • Smoking status: Current Every Day Smoker   Substance and Sexual Activity   • Alcohol use: No   • Drug use: Not on file   • Sexual activity: Not on file   Other Topics Concern   • Not on file   Social History Narrative   • Not on file       Review of Systems   Constitutional: Negative for fever and unexpected weight change.   HENT: Positive for congestion.    Respiratory: Negative for cough and shortness of breath.    Cardiovascular: Negative for chest pain.       Objective   Blood pressure 129/79, height 175.3 cm (69.02\"), weight 79.4 kg (175 lb).    Physical Exam   Constitutional: He is oriented to person, place, and time. He appears well-developed and well-nourished.   HENT:   Head: Normocephalic and atraumatic.   Eyes: EOM are normal. Pupils are equal, round, and reactive to light.   Pulmonary/Chest: Effort normal.   Neurological: He is oriented to person, place, and time. He has a normal Finger-Nose-Finger Test and a normal Heel to Shin Test. Gait normal.   Skin: Skin is warm and dry.   Psychiatric: He has a normal mood and affect. His speech is normal and behavior is normal. Judgment and thought content normal.   Nursing note and vitals reviewed.      Neurologic Exam     Mental Status   Oriented to person, place, and time.   Speech: speech is normal   Level of consciousness: alert  Knowledge: consistent with education.   Able to name object. Normal comprehension.     Cranial Nerves     CN II   Visual fields full to confrontation.     CN III, IV, VI   Pupils are equal, round, and reactive to light.  Extraocular motions are normal.     CN VII   Facial expression full, " symmetric.     CN IX, X   CN IX normal.   CN X normal.   Palate: symmetric    CN XI   CN XI normal.     CN XII   CN XII normal.     Motor Exam   Muscle bulk: normal  Right arm pronator drift: absent  Left arm pronator drift: absent    Strength   Strength 5/5 except as noted.     Gait, Coordination, and Reflexes     Gait  Gait: normal    Coordination   Finger to nose coordination: normal  Heel to shin coordination: normal    Tremor   Resting tremor: absent  Intention tremor: absent  Action tremor: absent      Assessment/Plan     Servando was seen today for seizures.    Diagnoses and all orders for this visit:    Partial symptomatic epilepsy with complex partial seizures, intractable, without status epilepticus (CMS/HCC)  -     Carbamazepine Level, Total; Future  -     Lamotrigine Level; Future      Discussion/Summary:  .I spent  19  minutes face to face with the patient, with 12 minutes spent  counselling:  Discussed getting blood levels today, will be peak, and will notify pt of results. Overall trend of improvement.  Discussed SEs, pharmacokinetics, RFs for breakthrough seizures, including stress. Discussed 106 model, to consider in coming year.   VNS device diagnostics OK, including lead impedance, current 1.5, magnet 1.75, on and off times unchanged.   Return in about 6 months (around 6/11/2019).

## 2018-12-14 LAB — LAMOTRIGINE SERPL-MCNC: 7 UG/ML (ref 2–20)

## 2019-01-10 RX ORDER — LAMOTRIGINE 50 MG/1
TABLET, EXTENDED RELEASE ORAL
Qty: 150 TABLET | Refills: 11 | Status: SHIPPED | OUTPATIENT
Start: 2019-01-10 | End: 2019-06-12 | Stop reason: SDUPTHER

## 2019-02-21 RX ORDER — CARBAMAZEPINE 200 MG/1
TABLET, EXTENDED RELEASE ORAL
Qty: 135 TABLET | Refills: 11 | OUTPATIENT
Start: 2019-02-21

## 2019-02-21 RX ORDER — CARBAMAZEPINE 200 MG/1
TABLET, EXTENDED RELEASE ORAL
Qty: 150 TABLET | Refills: 11 | Status: SHIPPED | OUTPATIENT
Start: 2019-02-21 | End: 2019-03-07 | Stop reason: SDUPTHER

## 2019-02-21 RX ORDER — LAMOTRIGINE 200 MG/1
TABLET ORAL
Qty: 75 TABLET | Refills: 11 | OUTPATIENT
Start: 2019-02-21

## 2019-02-21 NOTE — TELEPHONE ENCOUNTER
Sent in refill for Carbamazepine. Did not refill Lamotrigone because it has already been refilled.

## 2019-03-07 ENCOUNTER — TELEPHONE (OUTPATIENT)
Dept: NEUROLOGY | Facility: CLINIC | Age: 54
End: 2019-03-07

## 2019-03-07 RX ORDER — CARBAMAZEPINE 200 MG/1
TABLET, EXTENDED RELEASE ORAL
Qty: 150 TABLET | Refills: 11 | Status: SHIPPED | OUTPATIENT
Start: 2019-03-07 | End: 2021-01-25

## 2019-03-07 NOTE — TELEPHONE ENCOUNTER
----- Message from Jody Quintero sent at 3/7/2019  1:13 PM EST -----  Contact: MARISABEL CASTRO:    PT NEEDS REFILL ON : carBAMazepine XR (TEGretol XR) 200 MG 12 hr tablet    ##PT SAYS HE IS COMPLETELY OUT OF THIS RX    CALLBACK : [phone]

## 2019-03-11 RX ORDER — LAMOTRIGINE 200 MG/1
TABLET ORAL
Qty: 75 TABLET | Refills: 11 | Status: SHIPPED | OUTPATIENT
Start: 2019-03-11 | End: 2019-06-12 | Stop reason: SDUPTHER

## 2019-06-12 ENCOUNTER — OFFICE VISIT (OUTPATIENT)
Dept: NEUROLOGY | Facility: CLINIC | Age: 54
End: 2019-06-12

## 2019-06-12 VITALS
HEIGHT: 69 IN | DIASTOLIC BLOOD PRESSURE: 82 MMHG | OXYGEN SATURATION: 98 % | BODY MASS INDEX: 25.92 KG/M2 | SYSTOLIC BLOOD PRESSURE: 128 MMHG | HEART RATE: 67 BPM | WEIGHT: 175 LBS

## 2019-06-12 DIAGNOSIS — G40.219 PARTIAL SYMPTOMATIC EPILEPSY WITH COMPLEX PARTIAL SEIZURES, INTRACTABLE, WITHOUT STATUS EPILEPTICUS (HCC): Primary | ICD-10-CM

## 2019-06-12 PROCEDURE — 95976 ALYS SMPL CN NPGT PRGRMG: CPT | Performed by: PSYCHIATRY & NEUROLOGY

## 2019-06-12 PROCEDURE — 99214 OFFICE O/P EST MOD 30 MIN: CPT | Performed by: PSYCHIATRY & NEUROLOGY

## 2019-06-12 RX ORDER — LAMOTRIGINE 50 MG/1
TABLET, EXTENDED RELEASE ORAL
Qty: 150 TABLET | Refills: 11 | Status: SHIPPED | OUTPATIENT
Start: 2019-06-12 | End: 2020-09-15

## 2019-06-12 NOTE — PROGRESS NOTES
Subjective:    CC: Servando Estrada is seen today  for Seizures       HPI:  Current visit-this is a patient previously seen by Dr. Guerra for seizures.  As per patient he started having seizures at age 12.  With his events he has a paranoid feeling with sounds getting loud followed by loss of awareness where he stares off into space.  He has also had repetitive right arm movements with some of his seizures.  Denies any episodes with whole body shaking.  He had an EEG with Dr. brock Hernandez in 2004 that showed bitemporal slow wave activity more prominent on the left and a video EEG that showed probable frontal lobe onset.  He has tried several medications in the past including phenobarbital, phenytoin, Topamax and Keppra.  Currently takes Tegretol- mg in the morning and lamotrigine 200 mg along with lamotrigine ER 50 mg, 4 tablets daily.  Was supposed to take twice daily doses for both of them but takes just the morning dose.  His last levels in 2018 were-Lamictal-7, Tegretol-6.2.  Since his last visit in December he has had 2 seizures with one seizure being last week.  He also had a VNS placed in 2013.  Patient still gets hoarseness of voice while using the magnet.  We interrogated the VNS today.  Settings were as follows- output current 1.5, frequency 20 Hz, pulse width 250, on time 7 seconds, off time 0.5 minutes.  Magnet mode current was 1.75 with pulse width of 250 and on time of 60 seconds.  We reduced the magnet on time to 30 seconds.  We have also advised him to swipe the magnet at least once a month.  Of note-I reviewed Dr. Guerra's notes as follows-    Patient followed for several years for epilepsy starting around age 12. Has had evaluation by Dr. Klein at  including EEG in 2004 showing bitemporal focal slow wave activity more prominent on the left. VEEG showed probable frontal lobe onset. Seizures start with paranoid feeling, then everything sounds loud, then sometimes body movement. Aware  with some and not others. Recalls one where pushed back against couch, and right arm extended up in air. Usually arms or trunk or whole body movement; used to grit teeth. Has some where whole body tenses up, exhausted afterwards. Many in sleep.       Also has common migraines.  Has used PHB, PHT, Tegretol-XR and generic carbamazepine XR, has not tolerated TPM or LVT.  9/13 started LTG XR to total dose 400mg am. Reported no side effects on LTG ER but prefers taking LTG 200mg bid and LTG ER 50mg 4 qam.  Reported best month probably 5-6 seizures while awake (more in sleep), in a bad month -- many.     model placed 5/15. Noted no benefit, so 2/16 changed to rapid cycling, off time 1.1min, on time to 7 seconds.   9/16 Past couple months just total of 2-3 while awake (estimates 30% better), but many at night. Still neck pain with magnet.  Decreased off time to 0.8 minutes, no change on time of 7 seconds or current of 1.5mamp.   Decreased magnet PW to 250 and tolerated that well in the office.   10/17: Seizures pretty good overall -- 2-3 daytime sz in over 3 months, not many nocturnal either. Painful twinge in neck with magnet , so decreased to 130, increased magnet on time back to 60 seconds. Normal mode 1.5m amp, , on 7 sec off 0.8 minutes.  5/18: has been using the magnet to get used to it, and more tolerable. Magnet usually but does not always work. Increased magnet to 250PW.   8/18: doing well, one seizure he can think of in past couple mos. Changed dosing after forgot afternoon doses one day: taking only morning meds: CBZ XR 200mg 2 tabs, LTG 200mg and LTG ER 50mgx4. When he gets home from work, if feels sz coming, will take an extra CBZ XR, tolerates it well, and uses magnet if it continues. Believes no increase in night time szs. Using the magnet, still gets twinge and eye runs, but tolerable.   Today: feels near seizures in evenings eg 3-4x/mo (phi around Black Friday), takes extra CBZ pill, no  LOC. No missed days of work. No known nocturnal seizures. Magnet use still causes cough, mild twinge. Took dose of meds about 6:30 this am.   No headaches lately. Has a cold, o/w well.    The following portions of the patient's history were reviewed today and updated as of 06/12/2019  : allergies, current medications, past family history, past medical history, past social history, past surgical history and problem list  These document will be scanned to patient's chart.      Current Outpatient Medications:   •  carBAMazepine XR (TEGretol  XR) 200 MG 12 hr tablet, Take two pills twice daily, may take extra pill as instructed, Disp: 150 tablet, Rfl: 11  •  ibuprofen (ADVIL,MOTRIN) 600 MG tablet, Take 600 mg by mouth Every 6 (Six) Hours As Needed for Mild Pain ., Disp: , Rfl:   •  lamoTRIgine (LaMICtal) 200 MG tablet, TAKE ONE TABLET BY MOUTH TWICE DAILY, MAY  TAKE  EXTRA  PILL  AS  INSTRUCTED, Disp: 75 tablet, Rfl: 11  •  lamoTRIgine ER 50 MG tablet sustained-release 24 hour, Take 5 tabs daily each morning in addition to regular lamotrigine, Disp: 150 tablet, Rfl: 11  •  SUMAtriptan (IMITREX) 100 MG tablet, Take 1 tablet by mouth 1 (One) Time As Needed for Migraine for up to 1 dose., Disp: 9 tablet, Rfl: 11   Past Medical History:   Diagnosis Date   • Complex partial seizure (CMS/HCC)    • Headache, migraine       History reviewed. No pertinent surgical history.   Family History   Problem Relation Age of Onset   • Alzheimer's disease Other    • Dementia Other    • Cancer Other         aunt, uncle      Social History     Socioeconomic History   • Marital status: Single     Spouse name: Not on file   • Number of children: Not on file   • Years of education: Not on file   • Highest education level: Not on file   Tobacco Use   • Smoking status: Current Every Day Smoker   Substance and Sexual Activity   • Alcohol use: No     Review of Systems   HENT: Positive for voice change (Feels due to VNS).    Neurological: Positive  "for seizures and headache.   All other systems reviewed and are negative.      Objective:    /82   Pulse 67   Ht 175.3 cm (69.02\")   Wt 79.4 kg (175 lb)   SpO2 98%   BMI 25.83 kg/m²     Neurology Exam:    General apperance: NAD.     Mental status: Alert, awake and oriented to time place and person.    Recent and Remote memory: Intact.    Attention span and Concentration: Normal.     Language and Speech: Intact- No dysarthria.    Fluency, Naming , Repitition and Comprehension:  Intact    Cranial Nerves:   CN II: Visual fields are full. Intact. Fundi - Normal, No papillederma, Pupils - ISAAC  CN III, IV and VI: Extraocular movements are intact. Normal saccades.   CN V: Facial sensation is intact.   CN VII: Muscles of facial expression reveal no asymmetry. Intact.   CN VIII: Hearing is intact. Whispered voice intact.   CN IX and X: Palate elevates symmetrically. Intact  CN XI: Shoulder shrug is intact.   CN XII: Tongue is midline without evidence of atrophy or fasciculation.     Ophthalmoscopic exam of optic disc-normal    Motor:  Right UE muscle strength 5/5. Normal tone.     Left UE muscle strength 5/5. Normal tone.      Right LE muscle strength5/5. Normal tone.     Left LE muscle strength 5/5. Normal tone.      Sensory: Normal light touch, vibration and pinprick sensation bilaterally.    DTRs: 2+ bilaterally in upper and lower extremities.    Babinski: Negative bilaterally.    Co-ordination: Normal finger-to-nose, heel to shin B/L.    Rhomberg: Negative.    Gait: Normal.    Cardiovascular: Regular rate and rhythm without murmur, gallop or rub.    Assessment and Plan:  1. Partial symptomatic epilepsy with complex partial seizures, intractable, without status epilepticus (CMS/HCC)  Patient most likely has focal seizures from the left hemisphere  Continue Tegretol- mg daily and lamotrigine 200 mg daily.  I will increase his dose of lamotrigine ER 50 mg from 4 tablets to 5 tablets daily  We also " interrogated his VNS today and have changed his magnet on time from 60 to 30 seconds  Counseled on seizure precautions       Return in about 6 months (around 12/12/2019).         Poppy Olivares MD

## 2019-12-09 ENCOUNTER — OFFICE VISIT (OUTPATIENT)
Dept: NEUROLOGY | Facility: CLINIC | Age: 54
End: 2019-12-09

## 2019-12-09 VITALS
OXYGEN SATURATION: 97 % | WEIGHT: 181 LBS | DIASTOLIC BLOOD PRESSURE: 80 MMHG | BODY MASS INDEX: 26.81 KG/M2 | SYSTOLIC BLOOD PRESSURE: 122 MMHG | HEART RATE: 64 BPM | HEIGHT: 69 IN

## 2019-12-09 DIAGNOSIS — G40.219 PARTIAL SYMPTOMATIC EPILEPSY WITH COMPLEX PARTIAL SEIZURES, INTRACTABLE, WITHOUT STATUS EPILEPTICUS (HCC): Primary | ICD-10-CM

## 2019-12-09 PROCEDURE — 99214 OFFICE O/P EST MOD 30 MIN: CPT | Performed by: PSYCHIATRY & NEUROLOGY

## 2019-12-09 NOTE — PROGRESS NOTES
Subjective:    CC: Servando Estrada is seen today  for Seizures       HPI:  Current visit-patient comes in today accompanied by his partner.  He has not had any seizures since he last saw me.  At his last visit I had increased his dose of lamotrigine  mg daily.  He also continues to take regular Lamictal 200 mg daily in addition to Tegretol- mg daily.  We interrogated his VNS today and the seettings were as follows- output current 1.5, frequency 20 Hz, pulse width 250, on time 7 seconds, off time 0.5 minutes.  Magnet mode current was 1.75 with pulse width of 250 and on time of 30 seconds.  We did not change the settings.  Patient has been swiping the magnet at least a few times each month.    Current visit-this is a patient previously seen by Dr. Guerra for seizures.  As per patient he started having seizures at age 12.  With his events he has a paranoid feeling with sounds getting loud followed by loss of awareness where he stares off into space.  He has also had repetitive right arm movements with some of his seizures.  Denies any episodes with whole body shaking.  He had an EEG with Dr. brock Hernandez in 2004 that showed bitemporal slow wave activity more prominent on the left and a video EEG that showed probable frontal lobe onset.  He has tried several medications in the past including phenobarbital, phenytoin, Topamax and Keppra.  Currently takes Tegretol- mg in the morning and lamotrigine 200 mg along with lamotrigine ER 50 mg, 4 tablets daily.  Was supposed to take twice daily doses for both of them but takes just the morning dose.  His last levels in 2018 were-Lamictal-7, Tegretol-6.2.  Since his last visit in December he has had 2 seizures with one seizure being last week.  He also had a VNS placed in 2013.  Patient still gets hoarseness of voice while using the magnet.  We interrogated the VNS today.  Settings were as follows- output current 1.5, frequency 20 Hz, pulse width 250, on time 7  seconds, off time 0.5 minutes.  Magnet mode current was 1.75 with pulse width of 250 and on time of 60 seconds.  We reduced the magnet on time to 30 seconds.  We have also advised him to swipe the magnet at least once a month.  Of note-I reviewed Dr. Guerra's notes as follows-    Patient followed for several years for epilepsy starting around age 12. Has had evaluation by Dr. Klein at  including EEG in 2004 showing bitemporal focal slow wave activity more prominent on the left. VEEG showed probable frontal lobe onset. Seizures start with paranoid feeling, then everything sounds loud, then sometimes body movement. Aware with some and not others. Recalls one where pushed back against couch, and right arm extended up in air. Usually arms or trunk or whole body movement; used to grit teeth. Has some where whole body tenses up, exhausted afterwards. Many in sleep.       Also has common migraines.  Has used PHB, PHT, Tegretol-XR and generic carbamazepine XR, has not tolerated TPM or LVT.  9/13 started LTG XR to total dose 400mg am. Reported no side effects on LTG ER but prefers taking LTG 200mg bid and LTG ER 50mg 4 qam.  Reported best month probably 5-6 seizures while awake (more in sleep), in a bad month -- many.     model placed 5/15. Noted no benefit, so 2/16 changed to rapid cycling, off time 1.1min, on time to 7 seconds.   9/16 Past couple months just total of 2-3 while awake (estimates 30% better), but many at night. Still neck pain with magnet.  Decreased off time to 0.8 minutes, no change on time of 7 seconds or current of 1.5mamp.   Decreased magnet PW to 250 and tolerated that well in the office.   10/17: Seizures pretty good overall -- 2-3 daytime sz in over 3 months, not many nocturnal either. Painful twinge in neck with magnet , so decreased to 130, increased magnet on time back to 60 seconds. Normal mode 1.5m amp, , on 7 sec off 0.8 minutes.  5/18: has been using the magnet to get  used to it, and more tolerable. Magnet usually but does not always work. Increased magnet to 250PW.   8/18: doing well, one seizure he can think of in past couple mos. Changed dosing after forgot afternoon doses one day: taking only morning meds: CBZ XR 200mg 2 tabs, LTG 200mg and LTG ER 50mgx4. When he gets home from work, if feels sz coming, will take an extra CBZ XR, tolerates it well, and uses magnet if it continues. Believes no increase in night time szs. Using the magnet, still gets twinge and eye runs, but tolerable.   Today: feels near seizures in evenings eg 3-4x/mo (phi around Black Friday), takes extra CBZ pill, no LOC. No missed days of work. No known nocturnal seizures. Magnet use still causes cough, mild twinge. Took dose of meds about 6:30 this am.   No headaches lately. Has a cold, o/w well.    The following portions of the patient's history were reviewed today and updated as of 12/09/2019  : allergies, current medications, past family history, past medical history, past social history, past surgical history and problem list  These document will be scanned to patient's chart.      Current Outpatient Medications:   •  carBAMazepine XR (TEGretol  XR) 200 MG 12 hr tablet, Take two pills twice daily, may take extra pill as instructed, Disp: 150 tablet, Rfl: 11  •  ibuprofen (ADVIL,MOTRIN) 600 MG tablet, Take 600 mg by mouth Every 6 (Six) Hours As Needed for Mild Pain ., Disp: , Rfl:   •  lamoTRIgine (LaMICtal) 200 MG tablet, TAKE ONE TABLET BY MOUTH TWICE DAILY, MAY  TAKE  EXTRA  PILL  AS  INSTRUCTED, Disp: 75 tablet, Rfl: 11  •  lamoTRIgine ER 50 MG tablet sustained-release 24 hour, Take 5 tabs daily each morning in addition to regular lamotrigine, Disp: 150 tablet, Rfl: 11  •  SUMAtriptan (IMITREX) 100 MG tablet, Take 1 tablet by mouth 1 (One) Time As Needed for Migraine for up to 1 dose., Disp: 9 tablet, Rfl: 11   Past Medical History:   Diagnosis Date   • Complex partial seizure (CMS/HCC)    •  "Headache, migraine       No past surgical history on file.   Family History   Problem Relation Age of Onset   • Alzheimer's disease Other    • Dementia Other    • Cancer Other         aunt, uncle      Social History     Socioeconomic History   • Marital status: Single     Spouse name: Not on file   • Number of children: Not on file   • Years of education: Not on file   • Highest education level: Not on file   Tobacco Use   • Smoking status: Current Every Day Smoker   Substance and Sexual Activity   • Alcohol use: No     Review of Systems   Neurological: Positive for dizziness.   All other systems reviewed and are negative.      Objective:    /80   Pulse 64   Ht 175.3 cm (69\")   Wt 82.1 kg (181 lb)   SpO2 97%   BMI 26.73 kg/m²     Neurology Exam:    General apperance: NAD.     Mental status: Alert, awake and oriented to time place and person.    Recent and Remote memory: Intact.    Attention span and Concentration: Normal.     Language and Speech: Intact- No dysarthria.    Fluency, Naming , Repitition and Comprehension:  Intact    Cranial Nerves:   CN II: Visual fields are full. Intact. Fundi - Normal, No papillederma, Pupils - ISAAC  CN III, IV and VI: Extraocular movements are intact. Normal saccades.   CN V: Facial sensation is intact.   CN VII: Muscles of facial expression reveal no asymmetry. Intact.   CN VIII: Hearing is intact. Whispered voice intact.   CN IX and X: Palate elevates symmetrically. Intact  CN XI: Shoulder shrug is intact.   CN XII: Tongue is midline without evidence of atrophy or fasciculation.     Ophthalmoscopic exam of optic disc-normal    Motor:  Right UE muscle strength 5/5. Normal tone.     Left UE muscle strength 5/5. Normal tone.      Right LE muscle strength5/5. Normal tone.     Left LE muscle strength 5/5. Normal tone.      Sensory: Normal light touch, vibration and pinprick sensation bilaterally.    DTRs: 2+ bilaterally in upper and lower extremities.    Babinski: Negative " bilaterally.    Co-ordination: Normal finger-to-nose, heel to shin B/L.    Rhomberg: Negative.    Gait: Normal.    Cardiovascular: Regular rate and rhythm without murmur, gallop or rub.    Assessment and Plan:  1. Partial symptomatic epilepsy with complex partial seizures, intractable, without status epilepticus (CMS/HCC)  Patient most likely has focal seizures from the left hemisphere  Continue Tegretol- mg daily and lamotrigine 200 mg daily as well as lamotrigine  mg daily  We also interrogated his VNS today and maintained him on the same settings.  I have told him to swipe his magnet anytime he is sleep deprived or sick or feels a seizure coming on  Counseled on seizure precautions         Return in about 6 months (around 6/9/2020).       Poppy Olivares MD

## 2020-03-23 ENCOUNTER — TELEPHONE (OUTPATIENT)
Dept: NEUROLOGY | Facility: CLINIC | Age: 55
End: 2020-03-23

## 2020-03-23 RX ORDER — LAMOTRIGINE 200 MG/1
200 TABLET ORAL 2 TIMES DAILY
Qty: 75 TABLET | Refills: 11 | Status: SHIPPED | OUTPATIENT
Start: 2020-03-23 | End: 2020-06-09

## 2020-06-09 ENCOUNTER — OFFICE VISIT (OUTPATIENT)
Dept: NEUROLOGY | Facility: CLINIC | Age: 55
End: 2020-06-09

## 2020-06-09 ENCOUNTER — LAB (OUTPATIENT)
Dept: LAB | Facility: HOSPITAL | Age: 55
End: 2020-06-09

## 2020-06-09 VITALS
HEIGHT: 69 IN | HEART RATE: 67 BPM | WEIGHT: 178 LBS | DIASTOLIC BLOOD PRESSURE: 82 MMHG | SYSTOLIC BLOOD PRESSURE: 140 MMHG | BODY MASS INDEX: 26.36 KG/M2 | OXYGEN SATURATION: 97 %

## 2020-06-09 DIAGNOSIS — H93.A9 PULSATILE TINNITUS: ICD-10-CM

## 2020-06-09 DIAGNOSIS — G40.219 PARTIAL SYMPTOMATIC EPILEPSY WITH COMPLEX PARTIAL SEIZURES, INTRACTABLE, WITHOUT STATUS EPILEPTICUS (HCC): ICD-10-CM

## 2020-06-09 DIAGNOSIS — G40.219 PARTIAL SYMPTOMATIC EPILEPSY WITH COMPLEX PARTIAL SEIZURES, INTRACTABLE, WITHOUT STATUS EPILEPTICUS (HCC): Primary | ICD-10-CM

## 2020-06-09 LAB — CARBAMAZEPINE SERPL-MCNC: 6 MCG/ML (ref 4–12)

## 2020-06-09 PROCEDURE — 80175 DRUG SCREEN QUAN LAMOTRIGINE: CPT

## 2020-06-09 PROCEDURE — 36415 COLL VENOUS BLD VENIPUNCTURE: CPT

## 2020-06-09 PROCEDURE — 99214 OFFICE O/P EST MOD 30 MIN: CPT | Performed by: PSYCHIATRY & NEUROLOGY

## 2020-06-09 PROCEDURE — 80156 ASSAY CARBAMAZEPINE TOTAL: CPT

## 2020-06-09 RX ORDER — LAMOTRIGINE 200 MG/1
200 TABLET ORAL DAILY
COMMUNITY
End: 2021-07-20 | Stop reason: SDUPTHER

## 2020-06-09 NOTE — PROGRESS NOTES
Subjective:    CC: Servando Estrada is seen today  for Seizures and Follow-up       HPI:  Current visit- since the last visit patient states that he has had a few episodes which he attributes to stress at work (works at [employer]).  One episode was during sleep witnessed by his  when he was sleeping on the couch when he suddenly sat straight up and tried scooting down.  Was also trying to reach for objects.  This lasted for a minute and he went back to sleep later.  He has also had 2-3 episodes at work when he feels it coming on ( as the sounds around him get louder)  but does not lose awareness.  He either swipes his magnet or takes an extra dose of his medication at that time.  Using his VNS magnet still causes slight pain in his cheek.  In addition patient has also started to have auditory hallucinations where he hears the sound of crickets and bugs.  This is pulsatile in nature and coincides with the beating of his heart at times.    Last visit-patient comes in today accompanied by his partner.  He has not had any seizures since he last saw me.  At his last visit I had increased his dose of lamotrigine  mg daily.  He also continues to take regular Lamictal 200 mg daily in addition to Tegretol- mg daily.  We interrogated his VNS today and the seettings were as follows- output current 1.5, frequency 20 Hz, pulse width 250, on time 7 seconds, off time 0.5 minutes.  Magnet mode current was 1.75 with pulse width of 250 and on time of 30 seconds.  We did not change the settings.  Patient has been swiping the magnet at least a few times each month.    Current visit-this is a patient previously seen by Dr. Guerra for seizures.  As per patient he started having seizures at age 12.  With his events he has a paranoid feeling with sounds getting loud followed by loss of awareness where he stares off into space.  He has also had repetitive right arm movements with some of his seizures.  Denies any  episodes with whole body shaking.  He had an EEG with Dr. brock Hernandez in 2004 that showed bitemporal slow wave activity more prominent on the left and a video EEG that showed probable frontal lobe onset.  He has tried several medications in the past including phenobarbital, phenytoin, Topamax and Keppra.  Currently takes Tegretol- mg in the morning and lamotrigine 200 mg along with lamotrigine ER 50 mg, 4 tablets daily.  Was supposed to take twice daily doses for both of them but takes just the morning dose.  His last levels in 2018 were-Lamictal-7, Tegretol-6.2.  Since his last visit in December he has had 2 seizures with one seizure being last week.  He also had a VNS placed in 2013.  Patient still gets hoarseness of voice while using the magnet.  We interrogated the VNS today.  Settings were as follows- output current 1.5, frequency 20 Hz, pulse width 250, on time 7 seconds, off time 0.5 minutes.  Magnet mode current was 1.75 with pulse width of 250 and on time of 60 seconds.  We reduced the magnet on time to 30 seconds.  We have also advised him to swipe the magnet at least once a month.  Of note-I reviewed Dr. Guerra's notes as follows-    Patient followed for several years for epilepsy starting around age 12. Has had evaluation by Dr. Klein at  including EEG in 2004 showing bitemporal focal slow wave activity more prominent on the left. VEEG showed probable frontal lobe onset. Seizures start with paranoid feeling, then everything sounds loud, then sometimes body movement. Aware with some and not others. Recalls one where pushed back against couch, and right arm extended up in air. Usually arms or trunk or whole body movement; used to grit teeth. Has some where whole body tenses up, exhausted afterwards. Many in sleep.       Also has common migraines.  Has used PHB, PHT, Tegretol-XR and generic carbamazepine XR, has not tolerated TPM or LVT.  9/13 started LTG XR to total dose 400mg am. Reported no  side effects on LTG ER but prefers taking LTG 200mg bid and LTG ER 50mg 4 qam.  Reported best month probably 5-6 seizures while awake (more in sleep), in a bad month -- many.     model placed 5/15. Noted no benefit, so 2/16 changed to rapid cycling, off time 1.1min, on time to 7 seconds.   9/16 Past couple months just total of 2-3 while awake (estimates 30% better), but many at night. Still neck pain with magnet.  Decreased off time to 0.8 minutes, no change on time of 7 seconds or current of 1.5mamp.   Decreased magnet PW to 250 and tolerated that well in the office.   10/17: Seizures pretty good overall -- 2-3 daytime sz in over 3 months, not many nocturnal either. Painful twinge in neck with magnet , so decreased to 130, increased magnet on time back to 60 seconds. Normal mode 1.5m amp, , on 7 sec off 0.8 minutes.  5/18: has been using the magnet to get used to it, and more tolerable. Magnet usually but does not always work. Increased magnet to 250PW.   8/18: doing well, one seizure he can think of in past couple mos. Changed dosing after forgot afternoon doses one day: taking only morning meds: CBZ XR 200mg 2 tabs, LTG 200mg and LTG ER 50mgx4. When he gets home from work, if feels sz coming, will take an extra CBZ XR, tolerates it well, and uses magnet if it continues. Believes no increase in night time szs. Using the magnet, still gets twinge and eye runs, but tolerable.   Today: feels near seizures in evenings eg 3-4x/mo (phi around Black Friday), takes extra CBZ pill, no LOC. No missed days of work. No known nocturnal seizures. Magnet use still causes cough, mild twinge. Took dose of meds about 6:30 this am.   No headaches lately. Has a cold, o/w well.    The following portions of the patient's history were reviewed today and updated as of 12/09/2019  : allergies, current medications, past family history, past medical history, past social history, past surgical history and problem list   "These document will be scanned to patient's chart.      Current Outpatient Medications:   •  carBAMazepine XR (TEGretol  XR) 200 MG 12 hr tablet, Take two pills twice daily, may take extra pill as instructed, Disp: 150 tablet, Rfl: 11  •  ibuprofen (ADVIL,MOTRIN) 600 MG tablet, Take 600 mg by mouth Every 6 (Six) Hours As Needed for Mild Pain ., Disp: , Rfl:   •  lamoTRIgine (LaMICtal) 200 MG tablet, Take 1 tablet by mouth 2 (Two) Times a Day. May take extra pill as instructed., Disp: 75 tablet, Rfl: 11  •  lamoTRIgine ER 50 MG tablet sustained-release 24 hour, Take 5 tabs daily each morning in addition to regular lamotrigine, Disp: 150 tablet, Rfl: 11  •  SUMAtriptan (IMITREX) 100 MG tablet, Take 1 tablet by mouth 1 (One) Time As Needed for Migraine for up to 1 dose., Disp: 9 tablet, Rfl: 11   Past Medical History:   Diagnosis Date   • Complex partial seizure (CMS/HCC)    • Headache, migraine       History reviewed. No pertinent surgical history.   Family History   Problem Relation Age of Onset   • Alzheimer's disease Other    • Dementia Other    • Cancer Other         aunt, uncle      Social History     Socioeconomic History   • Marital status: Single     Spouse name: Not on file   • Number of children: Not on file   • Years of education: Not on file   • Highest education level: Not on file   Tobacco Use   • Smoking status: Current Every Day Smoker   • Smokeless tobacco: Never Used   Substance and Sexual Activity   • Alcohol use: No   • Drug use: Never   • Sexual activity: Defer     Review of Systems   Neurological: Positive for dizziness.   All other systems reviewed and are negative.      Objective:    /82   Pulse 67   Ht 175.3 cm (69\")   Wt 80.7 kg (178 lb)   SpO2 97%   BMI 26.29 kg/m²     Neurology Exam:    General apperance: NAD.     Mental status: Alert, awake and oriented to time place and person.    Recent and Remote memory: Intact.    Attention span and Concentration: Normal.     Language and " Speech: Intact- No dysarthria.    Fluency, Naming , Repitition and Comprehension:  Intact    Cranial Nerves:   CN II: Visual fields are full. Intact. Fundi - Normal, No papillederma, Pupils - ISAAC  CN III, IV and VI: Extraocular movements are intact. Normal saccades.   CN V: Facial sensation is intact.   CN VII: Muscles of facial expression reveal no asymmetry. Intact.   CN VIII: Hearing is intact. Whispered voice intact.   CN IX and X: Palate elevates symmetrically. Intact  CN XI: Shoulder shrug is intact.   CN XII: Tongue is midline without evidence of atrophy or fasciculation.     Ophthalmoscopic exam of optic disc-normal    Motor:  Right UE muscle strength 5/5. Normal tone.     Left UE muscle strength 5/5. Normal tone.      Right LE muscle strength5/5. Normal tone.     Left LE muscle strength 5/5. Normal tone.      Sensory: Normal light touch, vibration and pinprick sensation bilaterally.    DTRs: 2+ bilaterally in upper and lower extremities.    Babinski: Negative bilaterally.    Co-ordination: Normal finger-to-nose, heel to shin B/L.    Rhomberg: Negative.    Gait: Normal.    Cardiovascular: Regular rate and rhythm without murmur, gallop or rub.    Assessment and Plan:  1. Partial symptomatic epilepsy with complex partial seizures, intractable, without status epilepticus (CMS/HCC)  Patient most likely has focal seizures from the left hemisphere  Continue Tegretol- mg daily and lamotrigine 200 mg daily as well as lamotrigine  mg daily.  I will check his levels today and adjust his doses if needed  We did not interrogate his VNS today as patient states  he does not want his settings to be increased because he feels a sensation in his cheek even on his current settings  Counseled on seizure precautions     2.  Auditory hallucinations that are pulsatile in nature  I will give him an ENT evaluation so that he can have his hearing checked.  This could also be due to anxiety    Return in about 3 months  (around 9/9/2020).       Poppy Olivares MD

## 2020-06-10 NOTE — PROGRESS NOTES
Please notify the patient that the carbamazepine level is stable at 6.0 which is normal and essentially the same as 1 year ago.  Continue current medications.  Follow-up with Dr. Olivares as scheduled.  Thanks, CLINT Carrion.

## 2020-06-11 ENCOUNTER — TELEPHONE (OUTPATIENT)
Dept: NEUROLOGY | Facility: CLINIC | Age: 55
End: 2020-06-11

## 2020-06-11 LAB — LAMOTRIGINE SERPL-MCNC: 4.4 UG/ML (ref 2–20)

## 2020-06-11 NOTE — TELEPHONE ENCOUNTER
Relayed results to spouse, Dada who is on the verbal release and he stated understanding and will relay this information to his  who can call back if he has any questions. Otherwise he can continue on his meds unchanged and we will see him in September for his follow up appt. He stated verbal understanding.

## 2020-06-11 NOTE — TELEPHONE ENCOUNTER
----- Message from CLINT Fam sent at 6/10/2020  4:21 PM EDT -----  Please notify the patient that the carbamazepine level is stable at 6.0 which is normal and essentially the same as 1 year ago.  Continue current medications.  Follow-up with Dr. Olivares as scheduled.  Thanks, CLINT Carrion.

## 2020-06-12 NOTE — PROGRESS NOTES
Please notify patient his lamotrigine level is stable. Continue current dose. Follow up with Dr. Olivares as scheduled in office. Thanks, CLINT Carrion

## 2020-06-15 ENCOUNTER — TELEPHONE (OUTPATIENT)
Dept: NEUROLOGY | Facility: CLINIC | Age: 55
End: 2020-06-15

## 2020-06-15 NOTE — TELEPHONE ENCOUNTER
Attempted to call preferred # in chart, mailbox is full.    Called Dada's phone # and left a detailed vm with results. Office # given if any questions. Thanks!

## 2020-06-15 NOTE — TELEPHONE ENCOUNTER
----- Message from CLINT Fam sent at 6/12/2020  8:48 AM EDT -----  Please notify patient his lamotrigine level is stable. Continue current dose. Follow up with Dr. Olivares as scheduled in office. Thanks, CLINT Carrion

## 2020-09-15 ENCOUNTER — OFFICE VISIT (OUTPATIENT)
Dept: NEUROLOGY | Facility: CLINIC | Age: 55
End: 2020-09-15

## 2020-09-15 VITALS
BODY MASS INDEX: 25.42 KG/M2 | HEIGHT: 69 IN | TEMPERATURE: 97.8 F | DIASTOLIC BLOOD PRESSURE: 85 MMHG | WEIGHT: 171.6 LBS | HEART RATE: 57 BPM | SYSTOLIC BLOOD PRESSURE: 115 MMHG | OXYGEN SATURATION: 98 %

## 2020-09-15 DIAGNOSIS — G40.219 PARTIAL SYMPTOMATIC EPILEPSY WITH COMPLEX PARTIAL SEIZURES, INTRACTABLE, WITHOUT STATUS EPILEPTICUS (HCC): Primary | ICD-10-CM

## 2020-09-15 PROCEDURE — 99214 OFFICE O/P EST MOD 30 MIN: CPT | Performed by: PSYCHIATRY & NEUROLOGY

## 2020-09-15 RX ORDER — LAMOTRIGINE 300 MG/1
1 TABLET, EXTENDED RELEASE ORAL DAILY
Qty: 30 TABLET | Refills: 6 | Status: SHIPPED | OUTPATIENT
Start: 2020-09-15 | End: 2021-05-17

## 2020-09-15 NOTE — PROGRESS NOTES
Subjective:    CC: Servando Estrada is seen today  for Seizures       HPI:  Current visit- since the last visit patient has had 1 seizure this past Friday (sept 11)  he was extremely sleep deprived as he was awake the whole night prior to that taking care of his mother who has recently been diagnosed with dementia.  With this episode he felt an aura of paranoia followed by partial loss of awareness where he felt he was repeating  his movements followed by his hearing getting loud.  He continues to take Tegretol- mg daily and lamotrigine ER to 50 mg in the morning along with regular lamotrigine 200 mg.  His last levels of Tegretol were 6 (normal 4-12) and lamotrigine was 4.4 (2-20).  I also interrogated his VNS today and his settings were as follows out put current 1.5, frequency 20 Hz, pulse width 250, on time 7 seconds, off time 0.5 minutes.  Magnet mode current was 1.75 with pulse width of 250 and on time of 30 seconds.  We did not change his settings.    Last visit- since the last visit patient states that he has had a few episodes which he attributes to stress at work (works at [employer]).  One episode was during sleep witnessed by his  when he was sleeping on the couch when he suddenly sat straight up and tried scooting down.  Was also trying to reach for objects.  This lasted for a minute and he went back to sleep later.  He has also had 2-3 episodes at work when he feels it coming on ( as the sounds around him get louder)  but does not lose awareness.  He either swipes his magnet or takes an extra dose of his medication at that time.  Using his VNS magnet still causes slight pain in his cheek.  In addition patient has also started to have auditory hallucinations where he hears the sound of crickets and bugs.  This is pulsatile in nature and coincides with the beating of his heart at times.    Last visit-patient comes in today accompanied by his partner.  He has not had any seizures since he last  saw me.  At his last visit I had increased his dose of lamotrigine  mg daily.  He also continues to take regular Lamictal 200 mg daily in addition to Tegretol- mg daily.  We interrogated his VNS today and the seettings were as follows- output current 1.5, frequency 20 Hz, pulse width 250, on time 7 seconds, off time 0.5 minutes.  Magnet mode current was 1.75 with pulse width of 250 and on time of 30 seconds.  We did not change the settings.  Patient has been swiping the magnet at least a few times each month.    Last visit-this is a patient previously seen by Dr. Guerra for seizures.  As per patient he started having seizures at age 12.  With his events he has a paranoid feeling with sounds getting loud followed by loss of awareness where he stares off into space.  He has also had repetitive right arm movements with some of his seizures.  Denies any episodes with whole body shaking.  He had an EEG with Dr. brock Hernandez in 2004 that showed bitemporal slow wave activity more prominent on the left and a video EEG that showed probable frontal lobe onset.  He has tried several medications in the past including phenobarbital, phenytoin, Topamax and Keppra.  Currently takes Tegretol- mg in the morning and lamotrigine 200 mg along with lamotrigine ER 50 mg, 4 tablets daily.  Was supposed to take twice daily doses for both of them but takes just the morning dose.  His last levels in 2018 were-Lamictal-7, Tegretol-6.2.  Since his last visit in December he has had 2 seizures with one seizure being last week.  He also had a VNS placed in 2013.  Patient still gets hoarseness of voice while using the magnet.  We interrogated the VNS today.  Settings were as follows- output current 1.5, frequency 20 Hz, pulse width 250, on time 7 seconds, off time 0.5 minutes.  Magnet mode current was 1.75 with pulse width of 250 and on time of 60 seconds.  We reduced the magnet on time to 30 seconds.  We have also advised him to  swipe the magnet at least once a month.  Of note-I reviewed Dr. Guerra's notes as follows-    Patient followed for several years for epilepsy starting around age 12. Has had evaluation by Dr. Klein at  including EEG in 2004 showing bitemporal focal slow wave activity more prominent on the left. VEEG showed probable frontal lobe onset. Seizures start with paranoid feeling, then everything sounds loud, then sometimes body movement. Aware with some and not others. Recalls one where pushed back against couch, and right arm extended up in air. Usually arms or trunk or whole body movement; used to grit teeth. Has some where whole body tenses up, exhausted afterwards. Many in sleep.       Also has common migraines.  Has used PHB, PHT, Tegretol-XR and generic carbamazepine XR, has not tolerated TPM or LVT.  9/13 started LTG XR to total dose 400mg am. Reported no side effects on LTG ER but prefers taking LTG 200mg bid and LTG ER 50mg 4 qam.  Reported best month probably 5-6 seizures while awake (more in sleep), in a bad month -- many.     model placed 5/15. Noted no benefit, so 2/16 changed to rapid cycling, off time 1.1min, on time to 7 seconds.   9/16 Past couple months just total of 2-3 while awake (estimates 30% better), but many at night. Still neck pain with magnet.  Decreased off time to 0.8 minutes, no change on time of 7 seconds or current of 1.5mamp.   Decreased magnet PW to 250 and tolerated that well in the office.   10/17: Seizures pretty good overall -- 2-3 daytime sz in over 3 months, not many nocturnal either. Painful twinge in neck with magnet , so decreased to 130, increased magnet on time back to 60 seconds. Normal mode 1.5m amp, , on 7 sec off 0.8 minutes.  5/18: has been using the magnet to get used to it, and more tolerable. Magnet usually but does not always work. Increased magnet to 250PW.   8/18: doing well, one seizure he can think of in past couple mos. Changed dosing  after forgot afternoon doses one day: taking only morning meds: CBZ XR 200mg 2 tabs, LTG 200mg and LTG ER 50mgx4. When he gets home from work, if feels sz coming, will take an extra CBZ XR, tolerates it well, and uses magnet if it continues. Believes no increase in night time szs. Using the magnet, still gets twinge and eye runs, but tolerable.   Today: feels near seizures in evenings eg 3-4x/mo (phi around Black Friday), takes extra CBZ pill, no LOC. No missed days of work. No known nocturnal seizures. Magnet use still causes cough, mild twinge. Took dose of meds about 6:30 this am.   No headaches lately. Has a cold, o/w well.    The following portions of the patient's history were reviewed today and updated as of 12/09/2019  : allergies, current medications, past family history, past medical history, past social history, past surgical history and problem list  These document will be scanned to patient's chart.      Current Outpatient Medications:   •  carBAMazepine XR (TEGretol  XR) 200 MG 12 hr tablet, Take two pills twice daily, may take extra pill as instructed, Disp: 150 tablet, Rfl: 11  •  ibuprofen (ADVIL,MOTRIN) 600 MG tablet, Take 600 mg by mouth Every 6 (Six) Hours As Needed for Mild Pain ., Disp: , Rfl:   •  lamoTRIgine (LaMICtal) 200 MG tablet, Take 200 mg by mouth Daily., Disp: , Rfl:   •  SUMAtriptan (IMITREX) 100 MG tablet, Take 1 tablet by mouth 1 (One) Time As Needed for Migraine for up to 1 dose., Disp: 9 tablet, Rfl: 11  •  lamoTRIgine  MG tablet sustained-release 24 hour, Take 1 tablet by mouth Daily., Disp: 30 tablet, Rfl: 6   Past Medical History:   Diagnosis Date   • Complex partial seizure (CMS/HCC)    • Headache, migraine       History reviewed. No pertinent surgical history.   Family History   Problem Relation Age of Onset   • Alzheimer's disease Other    • Dementia Other    • Cancer Other         aunt, uncle      Social History     Socioeconomic History   • Marital status: Single     " Spouse name: Not on file   • Number of children: Not on file   • Years of education: Not on file   • Highest education level: Not on file   Tobacco Use   • Smoking status: Current Every Day Smoker   • Smokeless tobacco: Never Used   Substance and Sexual Activity   • Alcohol use: No   • Drug use: Never   • Sexual activity: Defer     Review of Systems   Neurological: Positive for dizziness.   All other systems reviewed and are negative.      Objective:    /85   Pulse 57   Temp 97.8 °F (36.6 °C) (Temporal)   Ht 175.3 cm (69.02\")   Wt 77.8 kg (171 lb 9.6 oz)   SpO2 98%   BMI 25.33 kg/m²     Neurology Exam:    General apperance: NAD.     Mental status: Alert, awake and oriented to time place and person.    Recent and Remote memory: Intact.    Attention span and Concentration: Normal.     Language and Speech: Intact- No dysarthria.    Fluency, Naming , Repitition and Comprehension:  Intact    Cranial Nerves:   CN II: Visual fields are full. Intact. Fundi - Normal, No papillederma, Pupils - ISAAC  CN III, IV and VI: Extraocular movements are intact. Normal saccades.   CN V: Facial sensation is intact.   CN VII: Muscles of facial expression reveal no asymmetry. Intact.   CN VIII: Hearing is intact. Whispered voice intact.   CN IX and X: Palate elevates symmetrically. Intact  CN XI: Shoulder shrug is intact.   CN XII: Tongue is midline without evidence of atrophy or fasciculation.     Ophthalmoscopic exam of optic disc-normal    Motor:  Right UE muscle strength 5/5. Normal tone.     Left UE muscle strength 5/5. Normal tone.      Right LE muscle strength5/5. Normal tone.     Left LE muscle strength 5/5. Normal tone.      Sensory: Normal light touch, vibration and pinprick sensation bilaterally.    DTRs: 2+ bilaterally in upper and lower extremities.    Babinski: Negative bilaterally.    Co-ordination: Normal finger-to-nose, heel to shin B/L.    Rhomberg: Negative.    Gait: Normal.    Cardiovascular: Regular rate " and rhythm without murmur, gallop or rub.    Assessment and Plan:  1. Partial symptomatic epilepsy with complex partial seizures, intractable, without status epilepticus (CMS/HCC)  Patient most likely has focal seizures from the left hemisphere  Continue Tegretol- mg daily and lamotrigine 200 mg daily.  I will increase his lamotrigine ER to 300 mg daily (he will go from the 50 mg pills to 1 pill of 300 mg).   I interrogated his VNS today and discussed replacing the VNS for a newer model with auto stim settings however patient states he is very stressed out currently and does not want to get the procedure done right now  Counseled on seizure precautions occluding no driving for 3 months from his last seizure        Return in about 4 months (around 1/15/2021).       Poppy Olivares MD

## 2020-10-27 ENCOUNTER — TELEPHONE (OUTPATIENT)
Dept: NEUROLOGY | Facility: CLINIC | Age: 55
End: 2020-10-27

## 2020-10-27 NOTE — TELEPHONE ENCOUNTER
PT IS CALLING STATING THAT HIS PCP WANT'S HIM TO HAVE A MRI CAUSE HE IS HAVING BACK PAIN AND HE IS ASKING IF IT WOULD EFFECT THE VNS. PLEASE CALL HIM BACK TO DISCUSS THIS MATTER, CALL HIM BACK [phone]

## 2020-10-27 NOTE — TELEPHONE ENCOUNTER
Does he want to get MRI of the lumbar spine?  Because the older VNS models that he has are not compatible with MRI lumbar spine

## 2020-10-28 NOTE — TELEPHONE ENCOUNTER
Spoke with patient and told him that it would not be compatible with his VNS. He said he would tell his PCP.

## 2021-01-25 RX ORDER — CARBAMAZEPINE 200 MG/1
TABLET, EXTENDED RELEASE ORAL
Qty: 450 TABLET | Refills: 0 | Status: SHIPPED | OUTPATIENT
Start: 2021-01-25 | End: 2021-02-25 | Stop reason: SDUPTHER

## 2021-02-23 ENCOUNTER — OFFICE VISIT (OUTPATIENT)
Dept: NEUROLOGY | Facility: CLINIC | Age: 56
End: 2021-02-23

## 2021-02-23 ENCOUNTER — LAB (OUTPATIENT)
Dept: LAB | Facility: HOSPITAL | Age: 56
End: 2021-02-23

## 2021-02-23 VITALS
OXYGEN SATURATION: 98 % | HEIGHT: 68 IN | TEMPERATURE: 98 F | BODY MASS INDEX: 26.55 KG/M2 | DIASTOLIC BLOOD PRESSURE: 84 MMHG | SYSTOLIC BLOOD PRESSURE: 116 MMHG | WEIGHT: 175.2 LBS | HEART RATE: 65 BPM

## 2021-02-23 DIAGNOSIS — G40.219 PARTIAL SYMPTOMATIC EPILEPSY WITH COMPLEX PARTIAL SEIZURES, INTRACTABLE, WITHOUT STATUS EPILEPTICUS (HCC): ICD-10-CM

## 2021-02-23 DIAGNOSIS — G40.219 PARTIAL SYMPTOMATIC EPILEPSY WITH COMPLEX PARTIAL SEIZURES, INTRACTABLE, WITHOUT STATUS EPILEPTICUS (HCC): Primary | ICD-10-CM

## 2021-02-23 PROCEDURE — 36415 COLL VENOUS BLD VENIPUNCTURE: CPT

## 2021-02-23 PROCEDURE — 80048 BASIC METABOLIC PNL TOTAL CA: CPT

## 2021-02-23 PROCEDURE — 99213 OFFICE O/P EST LOW 20 MIN: CPT | Performed by: PSYCHIATRY & NEUROLOGY

## 2021-02-23 PROCEDURE — 80175 DRUG SCREEN QUAN LAMOTRIGINE: CPT

## 2021-02-23 PROCEDURE — 80156 ASSAY CARBAMAZEPINE TOTAL: CPT

## 2021-02-23 RX ORDER — ASPIRIN 81 MG/1
81 TABLET ORAL DAILY
COMMUNITY

## 2021-02-23 NOTE — PROGRESS NOTES
Subjective:    CC: Servando Estrada is seen today  for Seizures       HPI:  Current visit-patient states he has not had any seizures since his last visit.  In fact his last episode was in September 11. But he has had a few episodes of buzzing in his ears however he did not lose awareness with the spells.  He increased his lamotrigine and is now taking lamotrigine  mg along with regular lamotrigine 200 mg daily.  Also takes Tegretol- mg twice a day.  He is also trying to get adequate sleep at night now.    Last VIisit- since the last visit patient has had 1 seizure this past Friday (sept 11)  he was extremely sleep deprived as he was awake the whole night prior to that taking care of his mother who has recently been diagnosed with dementia.  With this episode he felt an aura of paranoia followed by partial loss of awareness where he felt he was repeating  his movements followed by his hearing getting loud.  He continues to take Tegretol- mg twice daily and lamotrigine ER to 50 mg in the morning along with regular lamotrigine 200 mg.  His last levels of Tegretol were 6 (normal 4-12) and lamotrigine was 4.4 (2-20).  I also interrogated his VNS today and his settings were as follows out put current 1.5, frequency 20 Hz, pulse width 250, on time 7 seconds, off time 0.5 minutes.  Magnet mode current was 1.75 with pulse width of 250 and on time of 30 seconds.  We did not change his settings.    Last visit- since the last visit patient states that he has had a few episodes which he attributes to stress at work (works at [employer]).  One episode was during sleep witnessed by his  when he was sleeping on the couch when he suddenly sat straight up and tried scooting down.  Was also trying to reach for objects.  This lasted for a minute and he went back to sleep later.  He has also had 2-3 episodes at work when he feels it coming on ( as the sounds around him get louder)  but does not lose awareness.  He  either swipes his magnet or takes an extra dose of his medication at that time.  Using his VNS magnet still causes slight pain in his cheek.  In addition patient has also started to have auditory hallucinations where he hears the sound of crickets and bugs.  This is pulsatile in nature and coincides with the beating of his heart at times.    Last visit-patient comes in today accompanied by his partner.  He has not had any seizures since he last saw me.  At his last visit I had increased his dose of lamotrigine  mg daily.  He also continues to take regular Lamictal 200 mg daily in addition to Tegretol- mg daily.  We interrogated his VNS today and the seettings were as follows- output current 1.5, frequency 20 Hz, pulse width 250, on time 7 seconds, off time 0.5 minutes.  Magnet mode current was 1.75 with pulse width of 250 and on time of 30 seconds.  We did not change the settings.  Patient has been swiping the magnet at least a few times each month.    Last visit-this is a patient previously seen by Dr. Guerra for seizures.  As per patient he started having seizures at age 12.  With his events he has a paranoid feeling with sounds getting loud followed by loss of awareness where he stares off into space.  He has also had repetitive right arm movements with some of his seizures.  Denies any episodes with whole body shaking.  He had an EEG with  in 2004 that showed bitemporal slow wave activity more prominent on the left and a video EEG that showed probable frontal lobe onset.  He has tried several medications in the past including phenobarbital, phenytoin, Topamax and Keppra.  Currently takes Tegretol- mg in the morning and lamotrigine 200 mg along with lamotrigine ER 50 mg, 4 tablets daily.  Was supposed to take twice daily doses for both of them but takes just the morning dose.  His last levels in 2018 were-Lamictal-7, Tegretol-6.2.  Since his last visit in December he has had 2  seizures with one seizure being last week.  He also had a VNS placed in 2013.  Patient still gets hoarseness of voice while using the magnet.  We interrogated the VNS today.  Settings were as follows- output current 1.5, frequency 20 Hz, pulse width 250, on time 7 seconds, off time 0.5 minutes.  Magnet mode current was 1.75 with pulse width of 250 and on time of 60 seconds.  We reduced the magnet on time to 30 seconds.  We have also advised him to swipe the magnet at least once a month.  Of note-I reviewed Dr. Guerra's notes as follows-    Patient followed for several years for epilepsy starting around age 12. Has had evaluation by Dr. Klein at  including EEG in 2004 showing bitemporal focal slow wave activity more prominent on the left. VEEG showed probable frontal lobe onset. Seizures start with paranoid feeling, then everything sounds loud, then sometimes body movement. Aware with some and not others. Recalls one where pushed back against couch, and right arm extended up in air. Usually arms or trunk or whole body movement; used to grit teeth. Has some where whole body tenses up, exhausted afterwards. Many in sleep.       Also has common migraines.  Has used PHB, PHT, Tegretol-XR and generic carbamazepine XR, has not tolerated TPM or LVT.  9/13 started LTG XR to total dose 400mg am. Reported no side effects on LTG ER but prefers taking LTG 200mg bid and LTG ER 50mg 4 qam.  Reported best month probably 5-6 seizures while awake (more in sleep), in a bad month -- many.     model placed 5/15. Noted no benefit, so 2/16 changed to rapid cycling, off time 1.1min, on time to 7 seconds.   9/16 Past couple months just total of 2-3 while awake (estimates 30% better), but many at night. Still neck pain with magnet.  Decreased off time to 0.8 minutes, no change on time of 7 seconds or current of 1.5mamp.   Decreased magnet PW to 250 and tolerated that well in the office.   10/17: Seizures pretty good overall --  2-3 daytime sz in over 3 months, not many nocturnal either. Painful twinge in neck with magnet , so decreased to 130, increased magnet on time back to 60 seconds. Normal mode 1.5m amp, , on 7 sec off 0.8 minutes.  5/18: has been using the magnet to get used to it, and more tolerable. Magnet usually but does not always work. Increased magnet to 250PW.   8/18: doing well, one seizure he can think of in past couple mos. Changed dosing after forgot afternoon doses one day: taking only morning meds: CBZ XR 200mg 2 tabs, LTG 200mg and LTG ER 50mgx4. When he gets home from work, if feels sz coming, will take an extra CBZ XR, tolerates it well, and uses magnet if it continues. Believes no increase in night time szs. Using the magnet, still gets twinge and eye runs, but tolerable.   Today: feels near seizures in evenings eg 3-4x/mo (phi around Black Friday), takes extra CBZ pill, no LOC. No missed days of work. No known nocturnal seizures. Magnet use still causes cough, mild twinge. Took dose of meds about 6:30 this am.   No headaches lately. Has a cold, o/w well.    The following portions of the patient's history were reviewed today and updated as of 12/09/2019  : allergies, current medications, past family history, past medical history, past social history, past surgical history and problem list  These document will be scanned to patient's chart.      Current Outpatient Medications:   •  aspirin 81 MG EC tablet, Take 81 mg by mouth Daily., Disp: , Rfl:   •  carBAMazepine XR (TEGretol  XR) 200 MG 12 hr tablet, TAKE 2 TABLETS BY MOUTH TWICE DAILY, MAY  TAKE  EXTRA  PILL  AN  INSTRUCTED, Disp: 450 tablet, Rfl: 0  •  ibuprofen (ADVIL,MOTRIN) 600 MG tablet, Take 600 mg by mouth Every 6 (Six) Hours As Needed for Mild Pain ., Disp: , Rfl:   •  lamoTRIgine (LaMICtal) 200 MG tablet, Take 200 mg by mouth Daily., Disp: , Rfl:   •  lamoTRIgine  MG tablet sustained-release 24 hour, Take 1 tablet by mouth Daily., Disp:  "30 tablet, Rfl: 6  •  SUMAtriptan (IMITREX) 100 MG tablet, Take 1 tablet by mouth 1 (One) Time As Needed for Migraine for up to 1 dose., Disp: 9 tablet, Rfl: 11   Past Medical History:   Diagnosis Date   • Complex partial seizure (CMS/HCC)    • Headache, migraine       No past surgical history on file.   Family History   Problem Relation Age of Onset   • Alzheimer's disease Other    • Dementia Other    • Cancer Other         aunt, uncle      Social History     Socioeconomic History   • Marital status: Single     Spouse name: Not on file   • Number of children: Not on file   • Years of education: Not on file   • Highest education level: Not on file   Tobacco Use   • Smoking status: Current Every Day Smoker   • Smokeless tobacco: Never Used   Substance and Sexual Activity   • Alcohol use: No   • Drug use: Never   • Sexual activity: Defer     Review of Systems   Neurological: Positive for dizziness.   All other systems reviewed and are negative.      Objective:    /84   Pulse 65   Temp 98 °F (36.7 °C)   Ht 172.7 cm (68\")   Wt 79.5 kg (175 lb 3.2 oz)   SpO2 98%   BMI 26.64 kg/m²     Neurology Exam:    General apperance: NAD.     Mental status: Alert, awake and oriented to time place and person.    Recent and Remote memory: Intact.    Attention span and Concentration: Normal.     Language and Speech: Intact- No dysarthria.    Fluency, Naming , Repitition and Comprehension:  Intact    Cranial Nerves:   CN II: Visual fields are full. Intact. Fundi - Normal, No papillederma, Pupils - ISAAC  CN III, IV and VI: Extraocular movements are intact. Normal saccades.   CN V: Facial sensation is intact.   CN VII: Muscles of facial expression reveal no asymmetry. Intact.   CN VIII: Hearing is intact. Whispered voice intact.   CN IX and X: Palate elevates symmetrically. Intact  CN XI: Shoulder shrug is intact.   CN XII: Tongue is midline without evidence of atrophy or fasciculation.     Ophthalmoscopic exam of optic " disc-normal    Motor:  Right UE muscle strength 5/5. Normal tone.     Left UE muscle strength 5/5. Normal tone.      Right LE muscle strength5/5. Normal tone.     Left LE muscle strength 5/5. Normal tone.      Sensory: Normal light touch, vibration and pinprick sensation bilaterally.    DTRs: 2+ bilaterally in upper and lower extremities.    Babinski: Negative bilaterally.    Co-ordination: Normal finger-to-nose, heel to shin B/L.    Rhomberg: Negative.    Gait: Normal.    Cardiovascular: Regular rate and rhythm without murmur, gallop or rub.    Assessment and Plan:  1. Partial symptomatic epilepsy with complex partial seizures, intractable, without status epilepticus (CMS/HCC)  Patient most likely has focal seizures from the left hemisphere  Continue Tegretol- mg twice daily and lamotrigine 200 mg daily along with lamotrigine  mg daily  I will check levels of both lamotrigine and Tegretol today  At his last visit I interrogated his VNS and discussed replacing the VNS for a newer model with auto stim settings however patient stated that he is very stressed out currently and does not want to get the procedure done right now  Counseled on seizure precautions occluding no driving for 3 months from his last seizure        Return in about 5 months (around 7/23/2021).       Poppy Olivares MD

## 2021-02-24 LAB
ANION GAP SERPL CALCULATED.3IONS-SCNC: 10.2 MMOL/L (ref 5–15)
BUN SERPL-MCNC: 14 MG/DL (ref 6–20)
BUN/CREAT SERPL: 14.6 (ref 7–25)
CALCIUM SPEC-SCNC: 9.4 MG/DL (ref 8.6–10.5)
CARBAMAZEPINE SERPL-MCNC: 3.5 MCG/ML (ref 4–12)
CHLORIDE SERPL-SCNC: 105 MMOL/L (ref 98–107)
CO2 SERPL-SCNC: 25.8 MMOL/L (ref 22–29)
CREAT SERPL-MCNC: 0.96 MG/DL (ref 0.76–1.27)
GFR SERPL CREATININE-BSD FRML MDRD: 81 ML/MIN/1.73
GLUCOSE SERPL-MCNC: 79 MG/DL (ref 65–99)
POTASSIUM SERPL-SCNC: 4.7 MMOL/L (ref 3.5–5.2)
SODIUM SERPL-SCNC: 141 MMOL/L (ref 136–145)

## 2021-02-25 LAB — LAMOTRIGINE SERPL-MCNC: 6 UG/ML (ref 2–20)

## 2021-02-25 RX ORDER — CARBAMAZEPINE 200 MG/1
TABLET, EXTENDED RELEASE ORAL
Qty: 450 TABLET | Refills: 0 | Status: SHIPPED | OUTPATIENT
Start: 2021-02-25 | End: 2021-07-15

## 2021-02-26 ENCOUNTER — TELEPHONE (OUTPATIENT)
Dept: NEUROLOGY | Facility: CLINIC | Age: 56
End: 2021-02-26

## 2021-02-26 NOTE — TELEPHONE ENCOUNTER
----- Message from Poppy Olivares MD sent at 2/25/2021  4:29 PM EST -----  Please let the patient know that his lamotrigine level was normal but his carbamazepine level was extremely low.  I want him to increase his night dose of Tegretol ER to 600 mg (3 tablets).  He can continue taking 400 mg in the morning

## 2021-05-16 DIAGNOSIS — G40.219 PARTIAL SYMPTOMATIC EPILEPSY WITH COMPLEX PARTIAL SEIZURES, INTRACTABLE, WITHOUT STATUS EPILEPTICUS (HCC): Primary | ICD-10-CM

## 2021-05-17 RX ORDER — LAMOTRIGINE 300 MG/1
TABLET, EXTENDED RELEASE ORAL
Qty: 30 TABLET | Refills: 3 | Status: SHIPPED | OUTPATIENT
Start: 2021-05-17 | End: 2021-07-20 | Stop reason: SDUPTHER

## 2021-07-15 DIAGNOSIS — G40.219 PARTIAL SYMPTOMATIC EPILEPSY WITH COMPLEX PARTIAL SEIZURES, INTRACTABLE, WITHOUT STATUS EPILEPTICUS (HCC): Primary | ICD-10-CM

## 2021-07-15 RX ORDER — CARBAMAZEPINE 200 MG/1
TABLET, EXTENDED RELEASE ORAL
Qty: 450 TABLET | Refills: 0 | Status: CANCELLED | OUTPATIENT
Start: 2021-07-15

## 2021-07-15 RX ORDER — CARBAMAZEPINE 200 MG/1
TABLET, EXTENDED RELEASE ORAL
Qty: 450 TABLET | Refills: 5 | Status: SHIPPED | OUTPATIENT
Start: 2021-07-15 | End: 2021-07-19

## 2021-07-19 DIAGNOSIS — G40.219 PARTIAL SYMPTOMATIC EPILEPSY WITH COMPLEX PARTIAL SEIZURES, INTRACTABLE, WITHOUT STATUS EPILEPTICUS (HCC): ICD-10-CM

## 2021-07-19 RX ORDER — CARBAMAZEPINE 200 MG/1
TABLET, EXTENDED RELEASE ORAL
Qty: 450 TABLET | Refills: 5 | Status: SHIPPED | OUTPATIENT
Start: 2021-07-19 | End: 2021-07-20 | Stop reason: SDUPTHER

## 2021-07-19 NOTE — TELEPHONE ENCOUNTER
Carbmazepine 200 MG refill:     Last filled: 01/27/2021  Last appt: 02/23/2021  Next appt: 07/20/2021

## 2021-07-20 ENCOUNTER — OFFICE VISIT (OUTPATIENT)
Dept: NEUROLOGY | Facility: CLINIC | Age: 56
End: 2021-07-20

## 2021-07-20 VITALS
HEIGHT: 68 IN | SYSTOLIC BLOOD PRESSURE: 122 MMHG | OXYGEN SATURATION: 97 % | BODY MASS INDEX: 26.37 KG/M2 | DIASTOLIC BLOOD PRESSURE: 80 MMHG | HEART RATE: 62 BPM | WEIGHT: 174 LBS

## 2021-07-20 DIAGNOSIS — G40.219 PARTIAL SYMPTOMATIC EPILEPSY WITH COMPLEX PARTIAL SEIZURES, INTRACTABLE, WITHOUT STATUS EPILEPTICUS (HCC): Primary | ICD-10-CM

## 2021-07-20 PROCEDURE — 99214 OFFICE O/P EST MOD 30 MIN: CPT | Performed by: PSYCHIATRY & NEUROLOGY

## 2021-07-20 RX ORDER — LAMOTRIGINE 300 MG/1
1 TABLET, EXTENDED RELEASE ORAL DAILY
Qty: 90 TABLET | Refills: 3 | Status: SHIPPED | OUTPATIENT
Start: 2021-07-20 | End: 2022-07-20

## 2021-07-20 RX ORDER — LAMOTRIGINE 200 MG/1
200 TABLET ORAL DAILY
Qty: 90 TABLET | Refills: 3 | Status: SHIPPED | OUTPATIENT
Start: 2021-07-20 | End: 2022-10-21

## 2021-07-20 RX ORDER — CARBAMAZEPINE 200 MG/1
TABLET, EXTENDED RELEASE ORAL
Qty: 450 TABLET | Refills: 3 | Status: SHIPPED | OUTPATIENT
Start: 2021-07-20 | End: 2021-12-06 | Stop reason: SDUPTHER

## 2021-07-20 NOTE — PROGRESS NOTES
Subjective:    CC: Servando Estrada is seen today  for Seizures       HPI:  Current visit-patient denies having any seizures since the last visit.  He has also not had any of his typical auras of hearing buzzing sound or feeling paranoid.  His last Tegretol level was 3.5 (normal 4-12), lamotrigine level was 6 (2-20) and sodium was 141.  After that I told him to increase his dose of Tegretol-XR however he has continued to take his previous dose of 400 mg twice a day.  He also continues to take regular lamotrigine 200 mg in addition to lamotrigine  mg at night.  I interrogated his VNS today and his settings were maintained the same.  Output current of 1.5, frequency of 20 Hz, pulse width of 250, on time of 7 seconds and of time of 0.5 minutes.  Magnet mode current was 1.75 with a pulse width of 250 and on time of 30 seconds.  Tachycardia detection not available as this is the older model.  Battery life of 50 to 75% with resistance of 2677.  Last magnet swipe was in June.    Last visit-patient states he has not had any seizures since his last visit.  In fact his last episode was in September 11. But he has had a few episodes of buzzing in his ears however he did not lose awareness with the spells.  He increased his lamotrigine and is now taking lamotrigine  mg along with regular lamotrigine 200 mg daily.  Also takes Tegretol- mg twice a day.  He is also trying to get adequate sleep at night now.    Last VIisit- since the last visit patient has had 1 seizure this past Friday (sept 11)  he was extremely sleep deprived as he was awake the whole night prior to that taking care of his mother who has recently been diagnosed with dementia.  With this episode he felt an aura of paranoia followed by partial loss of awareness where he felt he was repeating  his movements followed by his hearing getting loud.  He continues to take Tegretol- mg twice daily and lamotrigine ER to 50 mg in the morning along  with regular lamotrigine 200 mg.  His last levels of Tegretol were 6 (normal 4-12) and lamotrigine was 4.4 (2-20).  I also interrogated his VNS today and his settings were as follows out put current 1.5, frequency 20 Hz, pulse width 250, on time 7 seconds, off time 0.5 minutes.  Magnet mode current was 1.75 with pulse width of 250 and on time of 30 seconds.  We did not change his settings.    Last visit- since the last visit patient states that he has had a few episodes which he attributes to stress at work (works at [employer]).  One episode was during sleep witnessed by his  when he was sleeping on the couch when he suddenly sat straight up and tried scooting down.  Was also trying to reach for objects.  This lasted for a minute and he went back to sleep later.  He has also had 2-3 episodes at work when he feels it coming on ( as the sounds around him get louder)  but does not lose awareness.  He either swipes his magnet or takes an extra dose of his medication at that time.  Using his VNS magnet still causes slight pain in his cheek.  In addition patient has also started to have auditory hallucinations where he hears the sound of crickets and bugs.  This is pulsatile in nature and coincides with the beating of his heart at times.    Last visit-patient comes in today accompanied by his partner.  He has not had any seizures since he last saw me.  At his last visit I had increased his dose of lamotrigine  mg daily.  He also continues to take regular Lamictal 200 mg daily in addition to Tegretol- mg daily.  We interrogated his VNS today and the seettings were as follows- output current 1.5, frequency 20 Hz, pulse width 250, on time 7 seconds, off time 0.5 minutes.  Magnet mode current was 1.75 with pulse width of 250 and on time of 30 seconds.  We did not change the settings.  Patient has been swiping the magnet at least a few times each month.    Last visit-this is a patient previously seen by   Mari for seizures.  As per patient he started having seizures at age 12.  With his events he has a paranoid feeling with sounds getting loud followed by loss of awareness where he stares off into space.  He has also had repetitive right arm movements with some of his seizures.  Denies any episodes with whole body shaking.  He had an EEG with  in 2004 that showed bitemporal slow wave activity more prominent on the left and a video EEG that showed probable frontal lobe onset.  He has tried several medications in the past including phenobarbital, phenytoin, Topamax and Keppra.  Currently takes Tegretol- mg in the morning and lamotrigine 200 mg along with lamotrigine ER 50 mg, 4 tablets daily.  Was supposed to take twice daily doses for both of them but takes just the morning dose.  His last levels in 2018 were-Lamictal-7, Tegretol-6.2.  Since his last visit in December he has had 2 seizures with one seizure being last week.  He also had a VNS placed in 2013.  Patient still gets hoarseness of voice while using the magnet.  We interrogated the VNS today.  Settings were as follows- output current 1.5, frequency 20 Hz, pulse width 250, on time 7 seconds, off time 0.5 minutes.  Magnet mode current was 1.75 with pulse width of 250 and on time of 60 seconds.  We reduced the magnet on time to 30 seconds.  We have also advised him to swipe the magnet at least once a month.  Of note-I reviewed Dr. Guerra's notes as follows-    Patient followed for several years for epilepsy starting around age 12. Has had evaluation by Dr. Klein at  including EEG in 2004 showing bitemporal focal slow wave activity more prominent on the left. VEEG showed probable frontal lobe onset. Seizures start with paranoid feeling, then everything sounds loud, then sometimes body movement. Aware with some and not others. Recalls one where pushed back against couch, and right arm extended up in air. Usually arms or trunk or whole  body movement; used to grit teeth. Has some where whole body tenses up, exhausted afterwards. Many in sleep.       Also has common migraines.  Has used PHB, PHT, Tegretol-XR and generic carbamazepine XR, has not tolerated TPM or LVT.  9/13 started LTG XR to total dose 400mg am. Reported no side effects on LTG ER but prefers taking LTG 200mg bid and LTG ER 50mg 4 qam.  Reported best month probably 5-6 seizures while awake (more in sleep), in a bad month -- many.     model placed 5/15. Noted no benefit, so 2/16 changed to rapid cycling, off time 1.1min, on time to 7 seconds.   9/16 Past couple months just total of 2-3 while awake (estimates 30% better), but many at night. Still neck pain with magnet.  Decreased off time to 0.8 minutes, no change on time of 7 seconds or current of 1.5mamp.   Decreased magnet PW to 250 and tolerated that well in the office.   10/17: Seizures pretty good overall -- 2-3 daytime sz in over 3 months, not many nocturnal either. Painful twinge in neck with magnet , so decreased to 130, increased magnet on time back to 60 seconds. Normal mode 1.5m amp, , on 7 sec off 0.8 minutes.  5/18: has been using the magnet to get used to it, and more tolerable. Magnet usually but does not always work. Increased magnet to 250PW.   8/18: doing well, one seizure he can think of in past couple mos. Changed dosing after forgot afternoon doses one day: taking only morning meds: CBZ XR 200mg 2 tabs, LTG 200mg and LTG ER 50mgx4. When he gets home from work, if feels sz coming, will take an extra CBZ XR, tolerates it well, and uses magnet if it continues. Believes no increase in night time szs. Using the magnet, still gets twinge and eye runs, but tolerable.   Today: feels near seizures in evenings eg 3-4x/mo (phi around Black Friday), takes extra CBZ pill, no LOC. No missed days of work. No known nocturnal seizures. Magnet use still causes cough, mild twinge. Took dose of meds about 6:30 this  "am.   No headaches lately. Has a cold, o/w well.    The following portions of the patient's history were reviewed today and updated as of 12/09/2019  : allergies, current medications, past family history, past medical history, past social history, past surgical history and problem list  These document will be scanned to patient's chart.      Current Outpatient Medications:   •  aspirin 81 MG EC tablet, Take 81 mg by mouth Daily., Disp: , Rfl:   •  carBAMazepine XR (TEGretol  XR) 200 MG 12 hr tablet, 2 tablets in the morning and 3 tablets at night, Disp: 450 tablet, Rfl: 3  •  ibuprofen (ADVIL,MOTRIN) 600 MG tablet, Take 600 mg by mouth Every 6 (Six) Hours As Needed for Mild Pain ., Disp: , Rfl:   •  lamoTRIgine (LaMICtal) 200 MG tablet, Take 1 tablet by mouth Daily., Disp: 90 tablet, Rfl: 3  •  lamoTRIgine  MG tablet sustained-release 24 hour, Take 1 tablet by mouth Daily., Disp: 90 tablet, Rfl: 3  •  SUMAtriptan (IMITREX) 100 MG tablet, Take 1 tablet by mouth 1 (One) Time As Needed for Migraine for up to 1 dose., Disp: 9 tablet, Rfl: 11   Past Medical History:   Diagnosis Date   • Complex partial seizure (CMS/HCC)    • Headache, migraine       No past surgical history on file.   Family History   Problem Relation Age of Onset   • Alzheimer's disease Other    • Dementia Other    • Cancer Other         aunt, uncle      Social History     Socioeconomic History   • Marital status: Single     Spouse name: Not on file   • Number of children: Not on file   • Years of education: Not on file   • Highest education level: Not on file   Tobacco Use   • Smoking status: Current Every Day Smoker   • Smokeless tobacco: Never Used   Substance and Sexual Activity   • Alcohol use: No   • Drug use: Never   • Sexual activity: Defer     Review of Systems   Neurological: Positive for dizziness.   All other systems reviewed and are negative.      Objective:    /80   Pulse 62   Ht 172.7 cm (67.99\")   Wt 78.9 kg (174 lb)   " SpO2 97%   BMI 26.46 kg/m²     Neurology Exam:    General apperance: NAD.     Mental status: Alert, awake and oriented to time place and person.    Recent and Remote memory: Intact.    Attention span and Concentration: Normal.     Language and Speech: Intact- No dysarthria.    Fluency, Naming , Repitition and Comprehension:  Intact    Cranial Nerves:   CN II: Visual fields are full. Intact. Fundi - Normal, No papillederma, Pupils - ISAAC  CN III, IV and VI: Extraocular movements are intact. Normal saccades.   CN V: Facial sensation is intact.   CN VII: Muscles of facial expression reveal no asymmetry. Intact.   CN VIII: Hearing is intact. Whispered voice intact.   CN IX and X: Palate elevates symmetrically. Intact  CN XI: Shoulder shrug is intact.   CN XII: Tongue is midline without evidence of atrophy or fasciculation.     Ophthalmoscopic exam of optic disc-normal    Motor:  Right UE muscle strength 5/5. Normal tone.     Left UE muscle strength 5/5. Normal tone.      Right LE muscle strength5/5. Normal tone.     Left LE muscle strength 5/5. Normal tone.      Sensory: Normal light touch, vibration and pinprick sensation bilaterally.    DTRs: 2+ bilaterally in upper and lower extremities.    Babinski: Negative bilaterally.    Co-ordination: Normal finger-to-nose, heel to shin B/L.    Rhomberg: Negative.    Gait: Normal.    Cardiovascular: Regular rate and rhythm without murmur, gallop or rub.    Assessment and Plan:  1. Partial symptomatic epilepsy with complex partial seizures, intractable, without status epilepticus (CMS/HCC)  Patient most likely has focal seizures from the left hemisphere  I have told him to increase his Tegretol-XR to 600 mg at night as his level was low.  He can continue taking 400 mg in the morning.    Should also continue lamotrigine 200 mg daily along with lamotrigine  mg daily   I interrogated his VNS and discussed replacing the VNS for a newer model with auto stim settings however  patient stated that he will not be able to afford it financially right now..  I have given him some literature to read about the newer model.    Counseled on seizure precautions         Return in about 6 months (around 1/20/2022).       Poppy Olivares MD

## 2021-12-06 ENCOUNTER — OFFICE VISIT (OUTPATIENT)
Dept: NEUROLOGY | Facility: CLINIC | Age: 56
End: 2021-12-06

## 2021-12-06 VITALS
HEIGHT: 68 IN | BODY MASS INDEX: 26.52 KG/M2 | WEIGHT: 175 LBS | DIASTOLIC BLOOD PRESSURE: 86 MMHG | HEART RATE: 66 BPM | OXYGEN SATURATION: 100 % | SYSTOLIC BLOOD PRESSURE: 150 MMHG

## 2021-12-06 DIAGNOSIS — G40.219 PARTIAL SYMPTOMATIC EPILEPSY WITH COMPLEX PARTIAL SEIZURES, INTRACTABLE, WITHOUT STATUS EPILEPTICUS (HCC): ICD-10-CM

## 2021-12-06 PROCEDURE — 99214 OFFICE O/P EST MOD 30 MIN: CPT | Performed by: PSYCHIATRY & NEUROLOGY

## 2021-12-06 PROCEDURE — 95977 ALYS CPLX CN NPGT PRGRMG: CPT | Performed by: PSYCHIATRY & NEUROLOGY

## 2021-12-06 RX ORDER — CARBAMAZEPINE 200 MG/1
TABLET, EXTENDED RELEASE ORAL
Qty: 180 TABLET | Refills: 6 | Status: SHIPPED | OUTPATIENT
Start: 2021-12-06 | End: 2022-11-10 | Stop reason: SDUPTHER

## 2021-12-06 NOTE — PROGRESS NOTES
Subjective:    CC: Servando Estrada is seen today  for Partial symptomatic epilepsy       HPI:  Current visit-patient states that he had about 4-5 seizures at the end of August as he was extremely stressed out because his  passed away.  With his episodes he felt buzzing sound in his ear/a feeling of paranoia followed by losing awareness.  Denies having any shaking episodes.  He continues to be compliant with his medications and did increase his Tegretol-XR to 400 mg in the morning and 600 milligrams at night in addition to lamotrigine 200/lamotrigine  mg.  He has only swiped his VNS magnet once in the past few months.  Today I interrogated his VNS and changed 3 of his settings.  Output current was increased to 1.75, frequency maintained at 20 Hz with pulse width of 250, on time was increased to 21 seconds and off time maintained at 0.5 minutes.  Magnet mode current was increased to 2 with a pulse rate of 250 and on time of 30 seconds.  Tachycardia detection not available.  Battery life is still 50 to 75% with an impedance of 2693.    Last visit-patient denies having any seizures since the last visit.  He has also not had any of his typical auras of hearing buzzing sound or feeling paranoid.  His last Tegretol level was 3.5 (normal 4-12), lamotrigine level was 6 (2-20) and sodium was 141.  After that I told him to increase his dose of Tegretol-XR however he has continued to take his previous dose of 400 mg twice a day.  He also continues to take regular lamotrigine 200 mg in addition to lamotrigine  mg at night.  I interrogated his VNS today and his settings were maintained the same.  Output current of 1.5, frequency of 20 Hz, pulse width of 250, on time of 7 seconds and of time of 0.5 minutes.  Magnet mode current was 1.75 with a pulse width of 250 and on time of 30 seconds.  Tachycardia detection not available as this is the older model.  Battery life of 50 to 75% with resistance of 2677.  Last  magnet swipe was in June.    Last visit-patient states he has not had any seizures since his last visit.  In fact his last episode was in September 11. But he has had a few episodes of buzzing in his ears however he did not lose awareness with the spells.  He increased his lamotrigine and is now taking lamotrigine  mg along with regular lamotrigine 200 mg daily.  Also takes Tegretol- mg twice a day.  He is also trying to get adequate sleep at night now.    Last VIisit- since the last visit patient has had 1 seizure this past Friday (sept 11)  he was extremely sleep deprived as he was awake the whole night prior to that taking care of his mother who has recently been diagnosed with dementia.  With this episode he felt an aura of paranoia followed by partial loss of awareness where he felt he was repeating  his movements followed by his hearing getting loud.  He continues to take Tegretol- mg twice daily and lamotrigine ER to 50 mg in the morning along with regular lamotrigine 200 mg.  His last levels of Tegretol were 6 (normal 4-12) and lamotrigine was 4.4 (2-20).  I also interrogated his VNS today and his settings were as follows out put current 1.5, frequency 20 Hz, pulse width 250, on time 7 seconds, off time 0.5 minutes.  Magnet mode current was 1.75 with pulse width of 250 and on time of 30 seconds.  We did not change his settings.    Last visit- since the last visit patient states that he has had a few episodes which he attributes to stress at work (works at [employer]).  One episode was during sleep witnessed by his  when he was sleeping on the couch when he suddenly sat straight up and tried scooting down.  Was also trying to reach for objects.  This lasted for a minute and he went back to sleep later.  He has also had 2-3 episodes at work when he feels it coming on ( as the sounds around him get louder)  but does not lose awareness.  He either swipes his magnet or takes an extra dose of  his medication at that time.  Using his VNS magnet still causes slight pain in his cheek.  In addition patient has also started to have auditory hallucinations where he hears the sound of crickets and bugs.  This is pulsatile in nature and coincides with the beating of his heart at times.    Last visit-patient comes in today accompanied by his partner.  He has not had any seizures since he last saw me.  At his last visit I had increased his dose of lamotrigine  mg daily.  He also continues to take regular Lamictal 200 mg daily in addition to Tegretol- mg daily.  We interrogated his VNS today and the seettings were as follows- output current 1.5, frequency 20 Hz, pulse width 250, on time 7 seconds, off time 0.5 minutes.  Magnet mode current was 1.75 with pulse width of 250 and on time of 30 seconds.  We did not change the settings.  Patient has been swiping the magnet at least a few times each month.    Last visit-this is a patient previously seen by Dr. Guerra for seizures.  As per patient he started having seizures at age 12.  With his events he has a paranoid feeling with sounds getting loud followed by loss of awareness where he stares off into space.  He has also had repetitive right arm movements with some of his seizures.  Denies any episodes with whole body shaking.  He had an EEG with  in 2004 that showed bitemporal slow wave activity more prominent on the left and a video EEG that showed probable frontal lobe onset.  He has tried several medications in the past including phenobarbital, phenytoin, Topamax and Keppra.  Currently takes Tegretol- mg in the morning and lamotrigine 200 mg along with lamotrigine ER 50 mg, 4 tablets daily.  Was supposed to take twice daily doses for both of them but takes just the morning dose.  His last levels in 2018 were-Lamictal-7, Tegretol-6.2.  Since his last visit in December he has had 2 seizures with one seizure being last week.  He also had  a VNS placed in 2013.  Patient still gets hoarseness of voice while using the magnet.  We interrogated the VNS today.  Settings were as follows- output current 1.5, frequency 20 Hz, pulse width 250, on time 7 seconds, off time 0.5 minutes.  Magnet mode current was 1.75 with pulse width of 250 and on time of 60 seconds.  We reduced the magnet on time to 30 seconds.  We have also advised him to swipe the magnet at least once a month.  Of note-I reviewed Dr. Guerra's notes as follows-    Patient followed for several years for epilepsy starting around age 12. Has had evaluation by Dr. Klein at  including EEG in 2004 showing bitemporal focal slow wave activity more prominent on the left. VEEG showed probable frontal lobe onset. Seizures start with paranoid feeling, then everything sounds loud, then sometimes body movement. Aware with some and not others. Recalls one where pushed back against couch, and right arm extended up in air. Usually arms or trunk or whole body movement; used to grit teeth. Has some where whole body tenses up, exhausted afterwards. Many in sleep.       Also has common migraines.  Has used PHB, PHT, Tegretol-XR and generic carbamazepine XR, has not tolerated TPM or LVT.  9/13 started LTG XR to total dose 400mg am. Reported no side effects on LTG ER but prefers taking LTG 200mg bid and LTG ER 50mg 4 qam.  Reported best month probably 5-6 seizures while awake (more in sleep), in a bad month -- many.     model placed 5/15. Noted no benefit, so 2/16 changed to rapid cycling, off time 1.1min, on time to 7 seconds.   9/16 Past couple months just total of 2-3 while awake (estimates 30% better), but many at night. Still neck pain with magnet.  Decreased off time to 0.8 minutes, no change on time of 7 seconds or current of 1.5mamp.   Decreased magnet PW to 250 and tolerated that well in the office.   10/17: Seizures pretty good overall -- 2-3 daytime sz in over 3 months, not many nocturnal  either. Painful twinge in neck with magnet , so decreased to 130, increased magnet on time back to 60 seconds. Normal mode 1.5m amp, , on 7 sec off 0.8 minutes.  5/18: has been using the magnet to get used to it, and more tolerable. Magnet usually but does not always work. Increased magnet to 250PW.   8/18: doing well, one seizure he can think of in past couple mos. Changed dosing after forgot afternoon doses one day: taking only morning meds: CBZ XR 200mg 2 tabs, LTG 200mg and LTG ER 50mgx4. When he gets home from work, if feels sz coming, will take an extra CBZ XR, tolerates it well, and uses magnet if it continues. Believes no increase in night time szs. Using the magnet, still gets twinge and eye runs, but tolerable.   Today: feels near seizures in evenings eg 3-4x/mo (phi around Black Friday), takes extra CBZ pill, no LOC. No missed days of work. No known nocturnal seizures. Magnet use still causes cough, mild twinge. Took dose of meds about 6:30 this am.   No headaches lately. Has a cold, o/w well.    The following portions of the patient's history were reviewed today and updated as of 12/09/2019  : allergies, current medications, past family history, past medical history, past social history, past surgical history and problem list  These document will be scanned to patient's chart.      Current Outpatient Medications:   •  aspirin 81 MG EC tablet, Take 81 mg by mouth Daily., Disp: , Rfl:   •  carBAMazepine XR (TEGretol  XR) 200 MG 12 hr tablet, Take 3 tablets twice a day, Disp: 180 tablet, Rfl: 6  •  ibuprofen (ADVIL,MOTRIN) 600 MG tablet, Take 600 mg by mouth Every 6 (Six) Hours As Needed for Mild Pain ., Disp: , Rfl:   •  lamoTRIgine (LaMICtal) 200 MG tablet, Take 1 tablet by mouth Daily., Disp: 90 tablet, Rfl: 3  •  SUMAtriptan (IMITREX) 100 MG tablet, Take 1 tablet by mouth 1 (One) Time As Needed for Migraine for up to 1 dose., Disp: 9 tablet, Rfl: 11  •  lamoTRIgine  MG tablet  "sustained-release 24 hour, Take 1 tablet by mouth Daily., Disp: 90 tablet, Rfl: 3   Past Medical History:   Diagnosis Date   • Complex partial seizure (HCC)    • Headache, migraine       History reviewed. No pertinent surgical history.   Family History   Problem Relation Age of Onset   • Alzheimer's disease Other    • Dementia Other    • Cancer Other         aunt, uncle      Social History     Socioeconomic History   • Marital status: Single   Tobacco Use   • Smoking status: Current Every Day Smoker   • Smokeless tobacco: Never Used   Vaping Use   • Vaping Use: Never used   Substance and Sexual Activity   • Alcohol use: No   • Drug use: Never   • Sexual activity: Defer     Review of Systems   Neurological: Positive for dizziness.   All other systems reviewed and are negative.      Objective:    /86   Pulse 66   Ht 172.7 cm (67.99\")   Wt 79.4 kg (175 lb)   SpO2 100%   BMI 26.62 kg/m²     Neurology Exam:    General apperance: NAD.     Mental status: Alert, awake and oriented to time place and person.    Recent and Remote memory: Intact.    Attention span and Concentration: Normal.     Language and Speech: Intact- No dysarthria.    Fluency, Naming , Repitition and Comprehension:  Intact    Cranial Nerves:   CN II: Visual fields are full. Intact. Fundi - Normal, No papillederma, Pupils - ISAAC  CN III, IV and VI: Extraocular movements are intact. Normal saccades.   CN V: Facial sensation is intact.   CN VII: Muscles of facial expression reveal no asymmetry. Intact.   CN VIII: Hearing is intact. Whispered voice intact.   CN IX and X: Palate elevates symmetrically. Intact  CN XI: Shoulder shrug is intact.   CN XII: Tongue is midline without evidence of atrophy or fasciculation.     Ophthalmoscopic exam of optic disc-normal    Motor:  Right UE muscle strength 5/5. Normal tone.     Left UE muscle strength 5/5. Normal tone.      Right LE muscle strength5/5. Normal tone.     Left LE muscle strength 5/5. Normal " tone.      Sensory: Normal light touch, vibration and pinprick sensation bilaterally.    DTRs: 2+ bilaterally in upper and lower extremities.    Babinski: Negative bilaterally.    Co-ordination: Normal finger-to-nose, heel to shin B/L.    Rhomberg: Negative.    Gait: Normal.    Cardiovascular: Regular rate and rhythm without murmur, gallop or rub.    Assessment and Plan:  1. Partial symptomatic epilepsy with complex partial seizures, intractable, without status epilepticus (CMS/HCC)  Patient most likely has focal seizures from the left hemisphere  I have told him to increase his Tegretol-XR to 600 mg twice a day.  Should also continue lamotrigine 200 mg daily along with lamotrigine  mg daily   I interrogated his VNS and changed 3 of her settings.  At the last visit I had talked about getting a new VNS device but patient had declined   Counseled on seizure precautions including swiping his VNS magnet when he feels his episode come on        Return in about 5 months (around 5/6/2022).       Poppy Olivares MD

## 2022-05-10 ENCOUNTER — OFFICE VISIT (OUTPATIENT)
Dept: NEUROLOGY | Facility: CLINIC | Age: 57
End: 2022-05-10

## 2022-05-10 DIAGNOSIS — G40.909 SEIZURE DISORDER: Primary | ICD-10-CM

## 2022-05-10 PROCEDURE — 99213 OFFICE O/P EST LOW 20 MIN: CPT | Performed by: PSYCHIATRY & NEUROLOGY

## 2022-05-10 NOTE — PROGRESS NOTES
Subjective:    CC: Servando Estrada is seen today  for Seizures (Follow up)       HPI:  Current visit- he denies having any seizures since his last visit.  In fact his last seizure was in August of last year.  He has increased his Tegretol-XR to 600 mg twice a day and is tolerating it well.  Also continues to take regular lamotrigine 200 mg in the morning and lamotrigine  mg at night.  We interrogated his VNS today and maintained him at the same settings.  Output current was 1.75 with frequency of 20 Hz, pulse width of 250, on time of 21 seconds and off time of 25 minutes.  Magnet current was 2 with pulse width of 250 and on time of 30 seconds.  Lead impedance was 2548 ohms with a battery life of 50 to 75% and magnet activations of 621.  He also states that his mood has been well controlled as he has a great family/friends support something that he has relied on since the death of his .  He also tries to keep himself busy by working on projects around the house.  Last month he did sustain a tick bite.  After that he was checked for Lyme's disease which was negative but he was diagnosed with [place] spotted fever and treated with antibiotics.  Currently denies any symptoms.    Last visit-patient states that he had about 4-5 seizures at the end of August as he was extremely stressed out because his  passed away.  With his episodes he felt buzzing sound in his ear/a feeling of paranoia followed by losing awareness.  Denies having any shaking episodes.  He continues to be compliant with his medications and did increase his Tegretol-XR to 400 mg in the morning and 600 milligrams at night in addition to lamotrigine 200/lamotrigine  mg.  He has only swiped his VNS magnet once in the past few months.  Today I interrogated his VNS and changed 3 of his settings.  Output current was increased to 1.75, frequency maintained at 20 Hz with pulse width of 250, on time was increased to 21 seconds and  off time maintained at 0.5 minutes.  Magnet mode current was increased to 2 with a pulse rate of 250 and on time of 30 seconds.  Tachycardia detection not available.  Battery life is still 50 to 75% with an impedance of 2693.    Last visit-patient denies having any seizures since the last visit.  He has also not had any of his typical auras of hearing buzzing sound or feeling paranoid.  His last Tegretol level was 3.5 (normal 4-12), lamotrigine level was 6 (2-20) and sodium was 141.  After that I told him to increase his dose of Tegretol-XR however he has continued to take his previous dose of 400 mg twice a day.  He also continues to take regular lamotrigine 200 mg in addition to lamotrigine  mg at night.  I interrogated his VNS today and his settings were maintained the same.  Output current of 1.5, frequency of 20 Hz, pulse width of 250, on time of 7 seconds and of time of 0.5 minutes.  Magnet mode current was 1.75 with a pulse width of 250 and on time of 30 seconds.  Tachycardia detection not available as this is the older model.  Battery life of 50 to 75% with resistance of 2677.  Last magnet swipe was in June.    Last visit-patient states he has not had any seizures since his last visit.  In fact his last episode was in September 11. But he has had a few episodes of buzzing in his ears however he did not lose awareness with the spells.  He increased his lamotrigine and is now taking lamotrigine  mg along with regular lamotrigine 200 mg daily.  Also takes Tegretol- mg twice a day.  He is also trying to get adequate sleep at night now.    Last VIisit- since the last visit patient has had 1 seizure this past Friday (sept 11)  he was extremely sleep deprived as he was awake the whole night prior to that taking care of his mother who has recently been diagnosed with dementia.  With this episode he felt an aura of paranoia followed by partial loss of awareness where he felt he was repeating  his  movements followed by his hearing getting loud.  He continues to take Tegretol- mg twice daily and lamotrigine ER to 50 mg in the morning along with regular lamotrigine 200 mg.  His last levels of Tegretol were 6 (normal 4-12) and lamotrigine was 4.4 (2-20).  I also interrogated his VNS today and his settings were as follows out put current 1.5, frequency 20 Hz, pulse width 250, on time 7 seconds, off time 0.5 minutes.  Magnet mode current was 1.75 with pulse width of 250 and on time of 30 seconds.  We did not change his settings.    Last visit- since the last visit patient states that he has had a few episodes which he attributes to stress at work (works at [employer]).  One episode was during sleep witnessed by his  when he was sleeping on the couch when he suddenly sat straight up and tried scooting down.  Was also trying to reach for objects.  This lasted for a minute and he went back to sleep later.  He has also had 2-3 episodes at work when he feels it coming on ( as the sounds around him get louder)  but does not lose awareness.  He either swipes his magnet or takes an extra dose of his medication at that time.  Using his VNS magnet still causes slight pain in his cheek.  In addition patient has also started to have auditory hallucinations where he hears the sound of crickets and bugs.  This is pulsatile in nature and coincides with the beating of his heart at times.    Last visit-patient comes in today accompanied by his partner.  He has not had any seizures since he last saw me.  At his last visit I had increased his dose of lamotrigine  mg daily.  He also continues to take regular Lamictal 200 mg daily in addition to Tegretol- mg daily.  We interrogated his VNS today and the seettings were as follows- output current 1.5, frequency 20 Hz, pulse width 250, on time 7 seconds, off time 0.5 minutes.  Magnet mode current was 1.75 with pulse width of 250 and on time of 30 seconds.  We did  not change the settings.  Patient has been swiping the magnet at least a few times each month.    Last visit-this is a patient previously seen by Dr. Guerra for seizures.  As per patient he started having seizures at age 12.  With his events he has a paranoid feeling with sounds getting loud followed by loss of awareness where he stares off into space.  He has also had repetitive right arm movements with some of his seizures.  Denies any episodes with whole body shaking.  He had an EEG with  in 2004 that showed bitemporal slow wave activity more prominent on the left and a video EEG that showed probable frontal lobe onset.  He has tried several medications in the past including phenobarbital, phenytoin, Topamax and Keppra.  Currently takes Tegretol- mg in the morning and lamotrigine 200 mg along with lamotrigine ER 50 mg, 4 tablets daily.  Was supposed to take twice daily doses for both of them but takes just the morning dose.  His last levels in 2018 were-Lamictal-7, Tegretol-6.2.  Since his last visit in December he has had 2 seizures with one seizure being last week.  He also had a VNS placed in 2013.  Patient still gets hoarseness of voice while using the magnet.  We interrogated the VNS today.  Settings were as follows- output current 1.5, frequency 20 Hz, pulse width 250, on time 7 seconds, off time 0.5 minutes.  Magnet mode current was 1.75 with pulse width of 250 and on time of 60 seconds.  We reduced the magnet on time to 30 seconds.  We have also advised him to swipe the magnet at least once a month.  Of note-I reviewed Dr. Guerra's notes as follows-    Patient followed for several years for epilepsy starting around age 12. Has had evaluation by Dr. Klein at  including EEG in 2004 showing bitemporal focal slow wave activity more prominent on the left. VEEG showed probable frontal lobe onset. Seizures start with paranoid feeling, then everything sounds loud, then sometimes body  movement. Aware with some and not others. Recalls one where pushed back against couch, and right arm extended up in air. Usually arms or trunk or whole body movement; used to grit teeth. Has some where whole body tenses up, exhausted afterwards. Many in sleep.       Also has common migraines.  Has used PHB, PHT, Tegretol-XR and generic carbamazepine XR, has not tolerated TPM or LVT.  9/13 started LTG XR to total dose 400mg am. Reported no side effects on LTG ER but prefers taking LTG 200mg bid and LTG ER 50mg 4 qam.  Reported best month probably 5-6 seizures while awake (more in sleep), in a bad month -- many.     model placed 5/15. Noted no benefit, so 2/16 changed to rapid cycling, off time 1.1min, on time to 7 seconds.   9/16 Past couple months just total of 2-3 while awake (estimates 30% better), but many at night. Still neck pain with magnet.  Decreased off time to 0.8 minutes, no change on time of 7 seconds or current of 1.5mamp.   Decreased magnet PW to 250 and tolerated that well in the office.   10/17: Seizures pretty good overall -- 2-3 daytime sz in over 3 months, not many nocturnal either. Painful twinge in neck with magnet , so decreased to 130, increased magnet on time back to 60 seconds. Normal mode 1.5m amp, , on 7 sec off 0.8 minutes.  5/18: has been using the magnet to get used to it, and more tolerable. Magnet usually but does not always work. Increased magnet to 250PW.   8/18: doing well, one seizure he can think of in past couple mos. Changed dosing after forgot afternoon doses one day: taking only morning meds: CBZ XR 200mg 2 tabs, LTG 200mg and LTG ER 50mgx4. When he gets home from work, if feels sz coming, will take an extra CBZ XR, tolerates it well, and uses magnet if it continues. Believes no increase in night time szs. Using the magnet, still gets twinge and eye runs, but tolerable.   Today: feels near seizures in evenings eg 3-4x/mo (phi around Black Friday), takes  extra CBZ pill, no LOC. No missed days of work. No known nocturnal seizures. Magnet use still causes cough, mild twinge. Took dose of meds about 6:30 this am.   No headaches lately. Has a cold, o/w well.    The following portions of the patient's history were reviewed today and updated as of 12/09/2019  : allergies, current medications, past family history, past medical history, past social history, past surgical history and problem list  These document will be scanned to patient's chart.      Current Outpatient Medications:   •  aspirin 81 MG EC tablet, Take 81 mg by mouth Daily., Disp: , Rfl:   •  carBAMazepine XR (TEGretol  XR) 200 MG 12 hr tablet, Take 3 tablets twice a day, Disp: 180 tablet, Rfl: 6  •  ibuprofen (ADVIL,MOTRIN) 600 MG tablet, Take 600 mg by mouth Every 6 (Six) Hours As Needed for Mild Pain ., Disp: , Rfl:   •  lamoTRIgine (LaMICtal) 200 MG tablet, Take 1 tablet by mouth Daily., Disp: 90 tablet, Rfl: 3  •  lamoTRIgine  MG tablet sustained-release 24 hour, Take 1 tablet by mouth Daily., Disp: 90 tablet, Rfl: 3  •  SUMAtriptan (IMITREX) 100 MG tablet, Take 1 tablet by mouth 1 (One) Time As Needed for Migraine for up to 1 dose., Disp: 9 tablet, Rfl: 11   Past Medical History:   Diagnosis Date   • Complex partial seizure (HCC)    • Headache, migraine       No past surgical history on file.   Family History   Problem Relation Age of Onset   • Alzheimer's disease Other    • Dementia Other    • Cancer Other         aunt, uncle      Social History     Socioeconomic History   • Marital status: Single   Tobacco Use   • Smoking status: Current Every Day Smoker   • Smokeless tobacco: Never Used   Vaping Use   • Vaping Use: Never used   Substance and Sexual Activity   • Alcohol use: No   • Drug use: Never   • Sexual activity: Defer     Review of Systems   Neurological: Positive for dizziness.   All other systems reviewed and are negative.      Objective:    There were no vitals taken for this  visit.    Neurology Exam:    General apperance: NAD.     Mental status: Alert, awake and oriented to time place and person.    Recent and Remote memory: Intact.    Attention span and Concentration: Normal.     Language and Speech: Intact- No dysarthria.    Fluency, Naming , Repitition and Comprehension:  Intact    Cranial Nerves:   CN II: Visual fields are full. Intact. Fundi - Normal, No papillederma, Pupils - ISAAC  CN III, IV and VI: Extraocular movements are intact. Normal saccades.   CN V: Facial sensation is intact.   CN VII: Muscles of facial expression reveal no asymmetry. Intact.   CN VIII: Hearing is intact. Whispered voice intact.   CN IX and X: Palate elevates symmetrically. Intact  CN XI: Shoulder shrug is intact.   CN XII: Tongue is midline without evidence of atrophy or fasciculation.     Ophthalmoscopic exam of optic disc-normal    Motor:  Right UE muscle strength 5/5. Normal tone.     Left UE muscle strength 5/5. Normal tone.      Right LE muscle strength5/5. Normal tone.     Left LE muscle strength 5/5. Normal tone.      Sensory: Normal light touch, vibration and pinprick sensation bilaterally.    DTRs: 2+ bilaterally in upper and lower extremities.    Babinski: Negative bilaterally.    Co-ordination: Normal finger-to-nose, heel to shin B/L.    Rhomberg: Negative.    Gait: Normal.    Cardiovascular: Regular rate and rhythm without murmur, gallop or rub.    Assessment and Plan:  1. Partial symptomatic epilepsy with complex partial seizures, intractable, without status epilepticus (CMS/HCC)  Patient most likely has focal seizures from the left hemisphere  I have told him to continue Tegretol-XR  600 mg twice a day and lamotrigine 200 mg daily along with lamotrigine  mg daily  I interrogated his VNS and maintained him at similar settings.  At the last visit I had talked about getting a new VNS device but patient had declined  Counseled on seizure precautions.  Of note-his blood pressure is  elevated today.  I have told him to monitor his blood pressure regularly at home and speak to his PCP about starting him on an antihypertensive if it is consistently elevated        Return in about 6 months (around 11/10/2022).       Poppy Olivares MD

## 2022-07-20 DIAGNOSIS — G40.219 PARTIAL SYMPTOMATIC EPILEPSY WITH COMPLEX PARTIAL SEIZURES, INTRACTABLE, WITHOUT STATUS EPILEPTICUS: ICD-10-CM

## 2022-07-20 RX ORDER — LAMOTRIGINE 300 MG/1
TABLET, EXTENDED RELEASE ORAL
Qty: 90 TABLET | Refills: 0 | Status: SHIPPED | OUTPATIENT
Start: 2022-07-20 | End: 2022-11-10 | Stop reason: SDUPTHER

## 2022-10-21 RX ORDER — LAMOTRIGINE 200 MG/1
TABLET ORAL
Qty: 90 TABLET | Refills: 0 | Status: SHIPPED | OUTPATIENT
Start: 2022-10-21 | End: 2022-11-10 | Stop reason: SDUPTHER

## 2022-10-21 NOTE — TELEPHONE ENCOUNTER
Rx Refill Note  Requested Prescriptions     Pending Prescriptions Disp Refills   • lamoTRIgine (LaMICtal) 200 MG tablet [Pharmacy Med Name: lamoTRIgine 200 MG Oral Tablet] 90 tablet 0     Sig: Take 1 tablet by mouth once daily      Last filled:07/20/2021  Last office visit with prescribing clinician: 5/10/2022      Next office visit with prescribing clinician: 11/10/2022     Debra Grace MA  10/21/22, 10:29 EDT

## 2022-11-10 ENCOUNTER — OFFICE VISIT (OUTPATIENT)
Dept: NEUROLOGY | Facility: CLINIC | Age: 57
End: 2022-11-10

## 2022-11-10 VITALS
WEIGHT: 183 LBS | HEART RATE: 63 BPM | SYSTOLIC BLOOD PRESSURE: 130 MMHG | BODY MASS INDEX: 27.74 KG/M2 | HEIGHT: 68 IN | DIASTOLIC BLOOD PRESSURE: 84 MMHG | OXYGEN SATURATION: 96 %

## 2022-11-10 DIAGNOSIS — G40.219 PARTIAL SYMPTOMATIC EPILEPSY WITH COMPLEX PARTIAL SEIZURES, INTRACTABLE, WITHOUT STATUS EPILEPTICUS: Primary | ICD-10-CM

## 2022-11-10 PROCEDURE — 99213 OFFICE O/P EST LOW 20 MIN: CPT | Performed by: PSYCHIATRY & NEUROLOGY

## 2022-11-10 PROCEDURE — 95976 ALYS SMPL CN NPGT PRGRMG: CPT | Performed by: PSYCHIATRY & NEUROLOGY

## 2022-11-10 RX ORDER — FLUTICASONE PROPIONATE 50 MCG
SPRAY, SUSPENSION (ML) NASAL
COMMUNITY
Start: 2022-08-25

## 2022-11-10 RX ORDER — LAMOTRIGINE 200 MG/1
200 TABLET ORAL DAILY
Qty: 90 TABLET | Refills: 3 | Status: SHIPPED | OUTPATIENT
Start: 2022-11-10

## 2022-11-10 RX ORDER — CARBAMAZEPINE 200 MG/1
TABLET, EXTENDED RELEASE ORAL
Qty: 180 TABLET | Refills: 6 | Status: SHIPPED | OUTPATIENT
Start: 2022-11-10

## 2022-11-10 RX ORDER — LAMOTRIGINE 300 MG/1
1 TABLET, EXTENDED RELEASE ORAL DAILY
Qty: 90 TABLET | Refills: 3 | Status: SHIPPED | OUTPATIENT
Start: 2022-11-10

## 2022-11-10 NOTE — PROGRESS NOTES
Subjective:    CC: Servando Estrada is seen today  for Seizures       HPI:  Current visit- patient states he has not had any seizures since last year August.  He also denies having any dizziness unless his allergies act up.  He continues to take Tegretol- mg twice daily in addition to lamotrigine 200 mg in the morning and lamotrigine  mg nightly.  He tells me today that he has been having severe hoarseness of voice every time his VNS goes off.  I interrogated his VNS today and increased his off time. Output current was 1.75 with frequency of 20 Hz, pulse width of 250, on time of 21 seconds and off time was changed from 0.5-1.1 minutes.  Magnet current was 2 with pulse width of 250 and on time of 30 seconds.  Lead impedance was 2419 ohms with a battery life of 50 to 75% and magnet activations of 623.    Last visit-he denies having any seizures since his last visit.  In fact his last seizure was in August of last year.  He has increased his Tegretol-XR to 600 mg twice a day and is tolerating it well.  Also continues to take regular lamotrigine 200 mg in the morning and lamotrigine  mg at night.  We interrogated his VNS today and maintained him at the same settings.  Output current was 1.75 with frequency of 20 Hz, pulse width of 250, on time of 21 seconds and off time of 0.5 minutes.  Magnet current was 2 with pulse width of 250 and on time of 30 seconds.  Lead impedance was 2548 ohms with a battery life of 50 to 75% and magnet activations of 621.  He also states that his mood has been well controlled as he has a great family/friends support something that he has relied on since the death of his .  He also tries to keep himself busy by working on projects around the house.  Last month he did sustain a tick bite.  After that he was checked for Lyme's disease which was negative but he was diagnosed with [place] spotted fever and treated with antibiotics.  Currently denies any  symptoms.    Last visit-patient states that he had about 4-5 seizures at the end of August as he was extremely stressed out because his  passed away.  With his episodes he felt buzzing sound in his ear/a feeling of paranoia followed by losing awareness.  Denies having any shaking episodes.  He continues to be compliant with his medications and did increase his Tegretol-XR to 400 mg in the morning and 600 milligrams at night in addition to lamotrigine 200/lamotrigine  mg.  He has only swiped his VNS magnet once in the past few months.  Today I interrogated his VNS and changed 3 of his settings.  Output current was increased to 1.75, frequency maintained at 20 Hz with pulse width of 250, on time was increased to 21 seconds and off time maintained at 0.5 minutes.  Magnet mode current was increased to 2 with a pulse rate of 250 and on time of 30 seconds.  Tachycardia detection not available.  Battery life is still 50 to 75% with an impedance of 2693.    Last visit-patient denies having any seizures since the last visit.  He has also not had any of his typical auras of hearing buzzing sound or feeling paranoid.  His last Tegretol level was 3.5 (normal 4-12), lamotrigine level was 6 (2-20) and sodium was 141.  After that I told him to increase his dose of Tegretol-XR however he has continued to take his previous dose of 400 mg twice a day.  He also continues to take regular lamotrigine 200 mg in addition to lamotrigine  mg at night.  I interrogated his VNS today and his settings were maintained the same.  Output current of 1.5, frequency of 20 Hz, pulse width of 250, on time of 7 seconds and of time of 0.5 minutes.  Magnet mode current was 1.75 with a pulse width of 250 and on time of 30 seconds.  Tachycardia detection not available as this is the older model.  Battery life of 50 to 75% with resistance of 2677.  Last magnet swipe was in June.    Last visit-patient states he has not had any seizures  since his last visit.  In fact his last episode was in September 11. But he has had a few episodes of buzzing in his ears however he did not lose awareness with the spells.  He increased his lamotrigine and is now taking lamotrigine  mg along with regular lamotrigine 200 mg daily.  Also takes Tegretol- mg twice a day.  He is also trying to get adequate sleep at night now.    Last VIisit- since the last visit patient has had 1 seizure this past Friday (sept 11)  he was extremely sleep deprived as he was awake the whole night prior to that taking care of his mother who has recently been diagnosed with dementia.  With this episode he felt an aura of paranoia followed by partial loss of awareness where he felt he was repeating  his movements followed by his hearing getting loud.  He continues to take Tegretol- mg twice daily and lamotrigine ER to 50 mg in the morning along with regular lamotrigine 200 mg.  His last levels of Tegretol were 6 (normal 4-12) and lamotrigine was 4.4 (2-20).  I also interrogated his VNS today and his settings were as follows out put current 1.5, frequency 20 Hz, pulse width 250, on time 7 seconds, off time 0.5 minutes.  Magnet mode current was 1.75 with pulse width of 250 and on time of 30 seconds.  We did not change his settings.    Last visit- since the last visit patient states that he has had a few episodes which he attributes to stress at work (works at [employer]).  One episode was during sleep witnessed by his  when he was sleeping on the couch when he suddenly sat straight up and tried scooting down.  Was also trying to reach for objects.  This lasted for a minute and he went back to sleep later.  He has also had 2-3 episodes at work when he feels it coming on ( as the sounds around him get louder)  but does not lose awareness.  He either swipes his magnet or takes an extra dose of his medication at that time.  Using his VNS magnet still causes slight pain in his  cheek.  In addition patient has also started to have auditory hallucinations where he hears the sound of crickets and bugs.  This is pulsatile in nature and coincides with the beating of his heart at times.    Last visit-patient comes in today accompanied by his partner.  He has not had any seizures since he last saw me.  At his last visit I had increased his dose of lamotrigine  mg daily.  He also continues to take regular Lamictal 200 mg daily in addition to Tegretol- mg daily.  We interrogated his VNS today and the seettings were as follows- output current 1.5, frequency 20 Hz, pulse width 250, on time 7 seconds, off time 0.5 minutes.  Magnet mode current was 1.75 with pulse width of 250 and on time of 30 seconds.  We did not change the settings.  Patient has been swiping the magnet at least a few times each month.    Last visit-this is a patient previously seen by Dr. Guerra for seizures.  As per patient he started having seizures at age 12.  With his events he has a paranoid feeling with sounds getting loud followed by loss of awareness where he stares off into space.  He has also had repetitive right arm movements with some of his seizures.  Denies any episodes with whole body shaking.  He had an EEG with  in 2004 that showed bitemporal slow wave activity more prominent on the left and a video EEG that showed probable frontal lobe onset.  He has tried several medications in the past including phenobarbital, phenytoin, Topamax and Keppra.  Currently takes Tegretol- mg in the morning and lamotrigine 200 mg along with lamotrigine ER 50 mg, 4 tablets daily.  Was supposed to take twice daily doses for both of them but takes just the morning dose.  His last levels in 2018 were-Lamictal-7, Tegretol-6.2.  Since his last visit in December he has had 2 seizures with one seizure being last week.  He also had a VNS placed in 2013.  Patient still gets hoarseness of voice while using the  magnet.  We interrogated the VNS today.  Settings were as follows- output current 1.5, frequency 20 Hz, pulse width 250, on time 7 seconds, off time 0.5 minutes.  Magnet mode current was 1.75 with pulse width of 250 and on time of 60 seconds.  We reduced the magnet on time to 30 seconds.  We have also advised him to swipe the magnet at least once a month.  Of note-I reviewed Dr. Guerra's notes as follows-    Patient followed for several years for epilepsy starting around age 12. Has had evaluation by Dr. Klein at  including EEG in 2004 showing bitemporal focal slow wave activity more prominent on the left. VEEG showed probable frontal lobe onset. Seizures start with paranoid feeling, then everything sounds loud, then sometimes body movement. Aware with some and not others. Recalls one where pushed back against couch, and right arm extended up in air. Usually arms or trunk or whole body movement; used to grit teeth. Has some where whole body tenses up, exhausted afterwards. Many in sleep.       Also has common migraines.  Has used PHB, PHT, Tegretol-XR and generic carbamazepine XR, has not tolerated TPM or LVT.  9/13 started LTG XR to total dose 400mg am. Reported no side effects on LTG ER but prefers taking LTG 200mg bid and LTG ER 50mg 4 qam.  Reported best month probably 5-6 seizures while awake (more in sleep), in a bad month -- many.     model placed 5/15. Noted no benefit, so 2/16 changed to rapid cycling, off time 1.1min, on time to 7 seconds.   9/16 Past couple months just total of 2-3 while awake (estimates 30% better), but many at night. Still neck pain with magnet.  Decreased off time to 0.8 minutes, no change on time of 7 seconds or current of 1.5mamp.   Decreased magnet PW to 250 and tolerated that well in the office.   10/17: Seizures pretty good overall -- 2-3 daytime sz in over 3 months, not many nocturnal either. Painful twinge in neck with magnet , so decreased to 130, increased  magnet on time back to 60 seconds. Normal mode 1.5m amp, , on 7 sec off 0.8 minutes.  5/18: has been using the magnet to get used to it, and more tolerable. Magnet usually but does not always work. Increased magnet to 250PW.   8/18: doing well, one seizure he can think of in past couple mos. Changed dosing after forgot afternoon doses one day: taking only morning meds: CBZ XR 200mg 2 tabs, LTG 200mg and LTG ER 50mgx4. When he gets home from work, if feels sz coming, will take an extra CBZ XR, tolerates it well, and uses magnet if it continues. Believes no increase in night time szs. Using the magnet, still gets twinge and eye runs, but tolerable.   Today: feels near seizures in evenings eg 3-4x/mo (phi around Black Friday), takes extra CBZ pill, no LOC. No missed days of work. No known nocturnal seizures. Magnet use still causes cough, mild twinge. Took dose of meds about 6:30 this am.   No headaches lately. Has a cold, o/w well.    The following portions of the patient's history were reviewed today and updated as of 12/09/2019  : allergies, current medications, past family history, past medical history, past social history, past surgical history and problem list  These document will be scanned to patient's chart.      Current Outpatient Medications:   •  aspirin 81 MG EC tablet, Take 81 mg by mouth Daily., Disp: , Rfl:   •  carBAMazepine XR (TEGretol  XR) 200 MG 12 hr tablet, Take 3 tablets twice a day, Disp: 180 tablet, Rfl: 6  •  fluticasone (FLONASE) 50 MCG/ACT nasal spray, , Disp: , Rfl:   •  ibuprofen (ADVIL,MOTRIN) 600 MG tablet, Take 600 mg by mouth Every 6 (Six) Hours As Needed for Mild Pain ., Disp: , Rfl:   •  lamoTRIgine (LaMICtal) 200 MG tablet, Take 1 tablet by mouth Daily., Disp: 90 tablet, Rfl: 3  •  lamoTRIgine  MG tablet sustained-release 24 hour, Take 1 tablet by mouth Daily., Disp: 90 tablet, Rfl: 3  •  SUMAtriptan (IMITREX) 100 MG tablet, Take 1 tablet by mouth 1 (One) Time As Needed  "for Migraine for up to 1 dose., Disp: 9 tablet, Rfl: 11   Past Medical History:   Diagnosis Date   • Complex partial seizure (HCC)    • Headache, migraine    • Headache, tension-type Aug. 2022   • HL (hearing loss) 1994   • Vision loss 2000      History reviewed. No pertinent surgical history.   Family History   Problem Relation Age of Onset   • Alzheimer's disease Other    • Dementia Other    • Cancer Other         aunt, uncle   • Dementia Mother    • Stroke Mother       Social History     Socioeconomic History   • Marital status: Single   Tobacco Use   • Smoking status: Every Day     Packs/day: 1.00     Years: 43.00     Pack years: 43.00     Types: Cigarettes     Start date: 7/8/1980   • Smokeless tobacco: Never   Vaping Use   • Vaping Use: Never used   Substance and Sexual Activity   • Alcohol use: No   • Drug use: Never   • Sexual activity: Not Currently     Partners: Male     Birth control/protection: Condom, Same-sex partner     Review of Systems   Neurological: Positive for dizziness.   All other systems reviewed and are negative.      Objective:    /84   Pulse 63   Ht 172.7 cm (67.99\")   Wt 83 kg (183 lb)   SpO2 96%   BMI 27.83 kg/m²     Neurology Exam: Reviewed and remains unchanged    General apperance: NAD.     Mental status: Alert, awake and oriented to time place and person.    Recent and Remote memory: Intact.    Attention span and Concentration: Normal.     Language and Speech: Intact- No dysarthria.    Fluency, Naming , Repitition and Comprehension:  Intact    Cranial Nerves:   CN II: Visual fields are full. Intact. Fundi - Normal, No papillederma, Pupils - ISAAC  CN III, IV and VI: Extraocular movements are intact. Normal saccades.   CN V: Facial sensation is intact.   CN VII: Muscles of facial expression reveal no asymmetry. Intact.   CN VIII: Hearing is intact. Whispered voice intact.   CN IX and X: Palate elevates symmetrically. Intact  CN XI: Shoulder shrug is intact.   CN XII: Tongue " is midline without evidence of atrophy or fasciculation.     Ophthalmoscopic exam of optic disc-normal    Motor:  Right UE muscle strength 5/5. Normal tone.     Left UE muscle strength 5/5. Normal tone.      Right LE muscle strength5/5. Normal tone.     Left LE muscle strength 5/5. Normal tone.      Sensory: Normal light touch, vibration and pinprick sensation bilaterally.    DTRs: 2+ bilaterally in upper and lower extremities.    Babinski: Negative bilaterally.    Co-ordination: Normal finger-to-nose, heel to shin B/L.    Rhomberg: Negative.    Gait: Normal.    Cardiovascular: Regular rate and rhythm without murmur, gallop or rub.    Assessment and Plan:  1. Partial symptomatic epilepsy with complex partial seizures, intractable, without status epilepticus (CMS/HCC)  Patient most likely has focal seizures from the left hemisphere  I have told him to continue Tegretol-XR  600 mg twice a day and lamotrigine 200 mg daily along with lamotrigine  mg daily  I interrogated his VNS and increased his off time to 1.1-minute as he is having side effects or voice changes.  At the last visit I had talked about getting a new VNS device but patient had declined  Counseled on seizure precautions.          Return in about 10 months (around 9/10/2023).       Poppy Olivares MD

## 2023-08-14 ENCOUNTER — OFFICE VISIT (OUTPATIENT)
Dept: NEUROLOGY | Facility: CLINIC | Age: 58
End: 2023-08-14
Payer: COMMERCIAL

## 2023-08-14 VITALS
BODY MASS INDEX: 28.37 KG/M2 | OXYGEN SATURATION: 95 % | WEIGHT: 187.2 LBS | SYSTOLIC BLOOD PRESSURE: 144 MMHG | HEART RATE: 63 BPM | HEIGHT: 68 IN | DIASTOLIC BLOOD PRESSURE: 92 MMHG

## 2023-08-14 DIAGNOSIS — G40.219 PARTIAL SYMPTOMATIC EPILEPSY WITH COMPLEX PARTIAL SEIZURES, INTRACTABLE, WITHOUT STATUS EPILEPTICUS: Primary | ICD-10-CM

## 2023-08-14 PROCEDURE — 99214 OFFICE O/P EST MOD 30 MIN: CPT | Performed by: PSYCHIATRY & NEUROLOGY

## 2023-08-14 NOTE — PROGRESS NOTES
Subjective:    CC: Servando Estrada is seen today  for Seizures (6mth follow up)       HPI:  Current visit-he denies having any seizures since his last visit.  His last seizure was in August 2021.  He has swiped his VNS magnet a few times.  He continues to take Tegretol- mg, 3 tablets twice daily and lamotrigine 200 mg in the morning with lamotrigine  mg nightly.  He continues to work at [employer].  I interrogated his VNS today and maintained him at the same settings-output current of 1.75 with magnet current of 2, frequency of 20 Hz, pulse width of 254 both normal and magnet, on time of 21 seconds for regular and 30 seconds for magnet, off time of 1.1-minute, battery life of 25 to 50% with lead impedance of 253 2 ohms.  He states that since his off time was increased the pain in his neck has subsided.      Last visit-patient states he has not had any seizures since last year August.  He also denies having any dizziness unless his allergies act up.  He continues to take Tegretol- mg twice daily in addition to lamotrigine 200 mg in the morning and lamotrigine  mg nightly.  He tells me today that he has been having severe hoarseness of voice every time his VNS goes off.  I interrogated his VNS today and increased his off time. Output current was 1.75 with frequency of 20 Hz, pulse width of 250, on time of 21 seconds and off time was changed from 0.5-1.1 minutes.  Magnet current was 2 with pulse width of 250 and on time of 30 seconds.  Lead impedance was 2419 ohms with a battery life of 50 to 75% and magnet activations of 623.    Last visit-he denies having any seizures since his last visit.  In fact his last seizure was in August of last year.  He has increased his Tegretol-XR to 600 mg twice a day and is tolerating it well.  Also continues to take regular lamotrigine 200 mg in the morning and lamotrigine  mg at night.  We interrogated his VNS today and maintained him at the same settings.   Output current was 1.75 with frequency of 20 Hz, pulse width of 250, on time of 21 seconds and off time of 0.5 minutes.  Magnet current was 2 with pulse width of 250 and on time of 30 seconds.  Lead impedance was 2548 ohms with a battery life of 50 to 75% and magnet activations of 621.  He also states that his mood has been well controlled as he has a great family/friends support something that he has relied on since the death of his .  He also tries to keep himself busy by working on projects around the house.  Last month he did sustain a tick bite.  After that he was checked for Lyme's disease which was negative but he was diagnosed with [place] spotted fever and treated with antibiotics.  Currently denies any symptoms.    Last visit-patient states that he had about 4-5 seizures at the end of August as he was extremely stressed out because his  passed away.  With his episodes he felt buzzing sound in his ear/a feeling of paranoia followed by losing awareness.  Denies having any shaking episodes.  He continues to be compliant with his medications and did increase his Tegretol-XR to 400 mg in the morning and 600 milligrams at night in addition to lamotrigine 200/lamotrigine  mg.  He has only swiped his VNS magnet once in the past few months.  Today I interrogated his VNS and changed 3 of his settings.  Output current was increased to 1.75, frequency maintained at 20 Hz with pulse width of 250, on time was increased to 21 seconds and off time maintained at 0.5 minutes.  Magnet mode current was increased to 2 with a pulse rate of 250 and on time of 30 seconds.  Tachycardia detection not available.  Battery life is still 50 to 75% with an impedance of 2693.    Last visit-patient denies having any seizures since the last visit.  He has also not had any of his typical auras of hearing buzzing sound or feeling paranoid.  His last Tegretol level was 3.5 (normal 4-12), lamotrigine level was 6  (2-20) and sodium was 141.  After that I told him to increase his dose of Tegretol-XR however he has continued to take his previous dose of 400 mg twice a day.  He also continues to take regular lamotrigine 200 mg in addition to lamotrigine  mg at night.  I interrogated his VNS today and his settings were maintained the same.  Output current of 1.5, frequency of 20 Hz, pulse width of 250, on time of 7 seconds and of time of 0.5 minutes.  Magnet mode current was 1.75 with a pulse width of 250 and on time of 30 seconds.  Tachycardia detection not available as this is the older model.  Battery life of 50 to 75% with resistance of 2677.  Last magnet swipe was in June.    Last visit-patient states he has not had any seizures since his last visit.  In fact his last episode was in September 11. But he has had a few episodes of buzzing in his ears however he did not lose awareness with the spells.  He increased his lamotrigine and is now taking lamotrigine  mg along with regular lamotrigine 200 mg daily.  Also takes Tegretol- mg twice a day.  He is also trying to get adequate sleep at night now.    Last VIisit- since the last visit patient has had 1 seizure this past Friday (sept 11)  he was extremely sleep deprived as he was awake the whole night prior to that taking care of his mother who has recently been diagnosed with dementia.  With this episode he felt an aura of paranoia followed by partial loss of awareness where he felt he was repeating  his movements followed by his hearing getting loud.  He continues to take Tegretol- mg twice daily and lamotrigine ER to 50 mg in the morning along with regular lamotrigine 200 mg.  His last levels of Tegretol were 6 (normal 4-12) and lamotrigine was 4.4 (2-20).  I also interrogated his VNS today and his settings were as follows out put current 1.5, frequency 20 Hz, pulse width 250, on time 7 seconds, off time 0.5 minutes.  Magnet mode current was 1.75 with  pulse width of 250 and on time of 30 seconds.  We did not change his settings.    Last visit- since the last visit patient states that he has had a few episodes which he attributes to stress at work (works at [employer]).  One episode was during sleep witnessed by his  when he was sleeping on the couch when he suddenly sat straight up and tried scooting down.  Was also trying to reach for objects.  This lasted for a minute and he went back to sleep later.  He has also had 2-3 episodes at work when he feels it coming on ( as the sounds around him get louder)  but does not lose awareness.  He either swipes his magnet or takes an extra dose of his medication at that time.  Using his VNS magnet still causes slight pain in his cheek.  In addition patient has also started to have auditory hallucinations where he hears the sound of crickets and bugs.  This is pulsatile in nature and coincides with the beating of his heart at times.    Last visit-patient comes in today accompanied by his partner.  He has not had any seizures since he last saw me.  At his last visit I had increased his dose of lamotrigine  mg daily.  He also continues to take regular Lamictal 200 mg daily in addition to Tegretol- mg daily.  We interrogated his VNS today and the seettings were as follows- output current 1.5, frequency 20 Hz, pulse width 250, on time 7 seconds, off time 0.5 minutes.  Magnet mode current was 1.75 with pulse width of 250 and on time of 30 seconds.  We did not change the settings.  Patient has been swiping the magnet at least a few times each month.    Last visit-this is a patient previously seen by Dr. Guerra for seizures.  As per patient he started having seizures at age 12.  With his events he has a paranoid feeling with sounds getting loud followed by loss of awareness where he stares off into space.  He has also had repetitive right arm movements with some of his seizures.  Denies any episodes with whole  body shaking.  He had an EEG with  in 2004 that showed bitemporal slow wave activity more prominent on the left and a video EEG that showed probable frontal lobe onset.  He has tried several medications in the past including phenobarbital, phenytoin, Topamax and Keppra.  Currently takes Tegretol- mg in the morning and lamotrigine 200 mg along with lamotrigine ER 50 mg, 4 tablets daily.  Was supposed to take twice daily doses for both of them but takes just the morning dose.  His last levels in 2018 were-Lamictal-7, Tegretol-6.2.  Since his last visit in December he has had 2 seizures with one seizure being last week.  He also had a VNS placed in 2013.  Patient still gets hoarseness of voice while using the magnet.  We interrogated the VNS today.  Settings were as follows- output current 1.5, frequency 20 Hz, pulse width 250, on time 7 seconds, off time 0.5 minutes.  Magnet mode current was 1.75 with pulse width of 250 and on time of 60 seconds.  We reduced the magnet on time to 30 seconds.  We have also advised him to swipe the magnet at least once a month.  Of note-I reviewed Dr. Guerra's notes as follows-    Patient followed for several years for epilepsy starting around age 12. Has had evaluation by Dr. Klein at  including EEG in 2004 showing bitemporal focal slow wave activity more prominent on the left. VEEG showed probable frontal lobe onset. Seizures start with paranoid feeling, then everything sounds loud, then sometimes body movement. Aware with some and not others. Recalls one where pushed back against couch, and right arm extended up in air. Usually arms or trunk or whole body movement; used to grit teeth. Has some where whole body tenses up, exhausted afterwards. Many in sleep.       Also has common migraines.  Has used PHB, PHT, Tegretol-XR and generic carbamazepine XR, has not tolerated TPM or LVT.  9/13 started LTG XR to total dose 400mg am. Reported no side effects on LTG ER  but prefers taking LTG 200mg bid and LTG ER 50mg 4 qam.  Reported best month probably 5-6 seizures while awake (more in sleep), in a bad month -- many.     model placed 5/15. Noted no benefit, so 2/16 changed to rapid cycling, off time 1.1min, on time to 7 seconds.   9/16 Past couple months just total of 2-3 while awake (estimates 30% better), but many at night. Still neck pain with magnet.  Decreased off time to 0.8 minutes, no change on time of 7 seconds or current of 1.5mamp.   Decreased magnet PW to 250 and tolerated that well in the office.   10/17: Seizures pretty good overall -- 2-3 daytime sz in over 3 months, not many nocturnal either. Painful twinge in neck with magnet , so decreased to 130, increased magnet on time back to 60 seconds. Normal mode 1.5m amp, , on 7 sec off 0.8 minutes.  5/18: has been using the magnet to get used to it, and more tolerable. Magnet usually but does not always work. Increased magnet to 250PW.   8/18: doing well, one seizure he can think of in past couple mos. Changed dosing after forgot afternoon doses one day: taking only morning meds: CBZ XR 200mg 2 tabs, LTG 200mg and LTG ER 50mgx4. When he gets home from work, if feels sz coming, will take an extra CBZ XR, tolerates it well, and uses magnet if it continues. Believes no increase in night time szs. Using the magnet, still gets twinge and eye runs, but tolerable.   Today: feels near seizures in evenings eg 3-4x/mo (phi around Black Friday), takes extra CBZ pill, no LOC. No missed days of work. No known nocturnal seizures. Magnet use still causes cough, mild twinge. Took dose of meds about 6:30 this am.   No headaches lately. Has a cold, o/w well.    The following portions of the patient's history were reviewed today and updated as of 12/09/2019  : allergies, current medications, past family history, past medical history, past social history, past surgical history and problem list  These document will be  "scanned to patient's chart.      Current Outpatient Medications:     aspirin 81 MG EC tablet, Take 1 tablet by mouth Daily., Disp: , Rfl:     carBAMazepine XR (TEGretol  XR) 200 MG 12 hr tablet, Take 3 tablets twice a day, Disp: 180 tablet, Rfl: 6    fluticasone (FLONASE) 50 MCG/ACT nasal spray, , Disp: , Rfl:     ibuprofen (ADVIL,MOTRIN) 600 MG tablet, Take 1 tablet by mouth Every 6 (Six) Hours As Needed for Mild Pain., Disp: , Rfl:     lamoTRIgine (LaMICtal) 200 MG tablet, Take 1 tablet by mouth Daily., Disp: 90 tablet, Rfl: 3    lamoTRIgine  MG tablet sustained-release 24 hour, Take 1 tablet by mouth Daily., Disp: 90 tablet, Rfl: 3    SUMAtriptan (IMITREX) 100 MG tablet, Take 1 tablet by mouth 1 (One) Time As Needed for Migraine for up to 1 dose., Disp: 9 tablet, Rfl: 11   Past Medical History:   Diagnosis Date    Complex partial seizure     Headache, migraine     Headache, tension-type Aug. 2022    HL (hearing loss) 1994    Vision loss 2000      History reviewed. No pertinent surgical history.   Family History   Problem Relation Age of Onset    Alzheimer's disease Other     Dementia Other     Cancer Other         aunt, uncle    Dementia Mother     Stroke Mother       Social History     Socioeconomic History    Marital status:    Tobacco Use    Smoking status: Every Day     Packs/day: 1.00     Years: 43.00     Pack years: 43.00     Types: Cigarettes     Start date: 7/8/1980     Passive exposure: Current    Smokeless tobacco: Never   Vaping Use    Vaping Use: Never used   Substance and Sexual Activity    Alcohol use: No    Drug use: Never    Sexual activity: Not Currently     Partners: Male     Birth control/protection: Condom, Same-sex partner     Review of Systems   Neurological: Positive for dizziness.   All other systems reviewed and are negative.      Objective:    /92   Pulse 63   Ht 172.7 cm (68\")   Wt 84.9 kg (187 lb 3.2 oz)   SpO2 95%   BMI 28.46 kg/mý     Neurology Exam: Reviewed " and remains unchanged    General apperance: NAD.     Mental status: Alert, awake and oriented to time place and person.    Recent and Remote memory: Intact.    Attention span and Concentration: Normal.     Language and Speech: Intact- No dysarthria.    Fluency, Naming , Repitition and Comprehension:  Intact    Cranial Nerves:   CN II: Visual fields are full. Intact. Fundi - Normal, No papillederma, Pupils - ISAAC  CN III, IV and VI: Extraocular movements are intact. Normal saccades.   CN V: Facial sensation is intact.   CN VII: Muscles of facial expression reveal no asymmetry. Intact.   CN VIII: Hearing is intact. Whispered voice intact.   CN IX and X: Palate elevates symmetrically. Intact  CN XI: Shoulder shrug is intact.   CN XII: Tongue is midline without evidence of atrophy or fasciculation.     Ophthalmoscopic exam of optic disc-normal    Motor:  Right UE muscle strength 5/5. Normal tone.     Left UE muscle strength 5/5. Normal tone.      Right LE muscle strength5/5. Normal tone.     Left LE muscle strength 5/5. Normal tone.      Sensory: Normal light touch, vibration and pinprick sensation bilaterally.    DTRs: 2+ bilaterally in upper and lower extremities.    Babinski: Negative bilaterally.    Co-ordination: Normal finger-to-nose, heel to shin B/L.    Rhomberg: Negative.    Gait: Normal.    Cardiovascular: Regular rate and rhythm without murmur, gallop or rub.    Assessment and Plan:  1. Partial symptomatic epilepsy with complex partial seizures, intractable, without status epilepticus (CMS/HCC)  Patient most likely has focal seizures from the left hemisphere  I have told him to continue Tegretol-XR  600 mg twice a day and lamotrigine 200 mg daily along with lamotrigine  mg daily  I interrogated his VNS.  I will have to refer him for a battery change at his next visit.  I discussed getting a newer VNS model with the auto stim feature however patient is reluctant because of her higher co-pay.  We will  contact the VNS rep to look into this.  Counseled on seizure precautions.          Return in about 6 months (around 2/14/2024).       Poppy Olivares MD

## 2023-12-09 DIAGNOSIS — G40.219 PARTIAL SYMPTOMATIC EPILEPSY WITH COMPLEX PARTIAL SEIZURES, INTRACTABLE, WITHOUT STATUS EPILEPTICUS: ICD-10-CM

## 2023-12-11 RX ORDER — LAMOTRIGINE 200 MG/1
200 TABLET ORAL DAILY
Qty: 90 TABLET | Refills: 0 | Status: SHIPPED | OUTPATIENT
Start: 2023-12-11

## 2023-12-11 RX ORDER — LAMOTRIGINE 300 MG/1
1 TABLET, EXTENDED RELEASE ORAL DAILY
Qty: 90 TABLET | Refills: 0 | Status: SHIPPED | OUTPATIENT
Start: 2023-12-11

## 2023-12-11 RX ORDER — CARBAMAZEPINE 200 MG/1
TABLET, EXTENDED RELEASE ORAL
Qty: 540 TABLET | Refills: 0 | Status: SHIPPED | OUTPATIENT
Start: 2023-12-11

## 2023-12-11 NOTE — TELEPHONE ENCOUNTER
Rx Refill Note  Requested Prescriptions     Pending Prescriptions Disp Refills    lamoTRIgine  MG tablet sustained-release 24 hour [Pharmacy Med Name: lamoTRIgine  MG Oral Tablet Extended Release 24 Hour] 90 tablet 0     Sig: Take 1 tablet by mouth once daily    lamoTRIgine (LaMICtal) 200 MG tablet [Pharmacy Med Name: lamoTRIgine 200 MG Oral Tablet] 90 tablet 0     Sig: Take 1 tablet by mouth once daily    carBAMazepine XR (TEGretol  XR) 200 MG 12 hr tablet [Pharmacy Med Name: carBAMazepine  MG Oral Tablet Extended Release 12 Hour] 540 tablet 0     Sig: TAKE 3 TABLETS BY MOUTH TWICE DAILY      Last filled:  Last office visit with prescribing clinician: 8/14/2023      Next office visit with prescribing clinician: 2/19/2024     Rita Singleton MA  12/11/23, 11:44 EST

## 2024-02-19 ENCOUNTER — OFFICE VISIT (OUTPATIENT)
Dept: NEUROLOGY | Facility: CLINIC | Age: 59
End: 2024-02-19
Payer: COMMERCIAL

## 2024-02-19 VITALS
SYSTOLIC BLOOD PRESSURE: 160 MMHG | WEIGHT: 191 LBS | HEART RATE: 64 BPM | DIASTOLIC BLOOD PRESSURE: 102 MMHG | OXYGEN SATURATION: 98 % | BODY MASS INDEX: 28.95 KG/M2 | HEIGHT: 68 IN

## 2024-02-19 DIAGNOSIS — G40.219 PARTIAL SYMPTOMATIC EPILEPSY WITH COMPLEX PARTIAL SEIZURES, INTRACTABLE, WITHOUT STATUS EPILEPTICUS: Primary | ICD-10-CM

## 2024-02-19 PROCEDURE — 99214 OFFICE O/P EST MOD 30 MIN: CPT | Performed by: PSYCHIATRY & NEUROLOGY

## 2024-02-19 RX ORDER — LAMOTRIGINE 200 MG/1
200 TABLET ORAL DAILY
Qty: 90 TABLET | Refills: 3 | Status: SHIPPED | OUTPATIENT
Start: 2024-02-19

## 2024-02-19 RX ORDER — LAMOTRIGINE 300 MG/1
1 TABLET, EXTENDED RELEASE ORAL DAILY
Qty: 90 TABLET | Refills: 3 | Status: SHIPPED | OUTPATIENT
Start: 2024-02-19

## 2024-02-19 RX ORDER — CARBAMAZEPINE 200 MG/1
TABLET, EXTENDED RELEASE ORAL
Qty: 540 TABLET | Refills: 3 | Status: SHIPPED | OUTPATIENT
Start: 2024-02-19

## 2024-02-19 NOTE — PROGRESS NOTES
Subjective:    CC: Servando Estrada is seen today  for Partial symptomatic epilepsy with complex partial seizures,       HPI:  Current visit- patient denies having any seizures since his last visit.  He lives at home by himself but has not felt any episodes of confusion.  He continues to take Tegretol- mg twice daily in addition to lamotrigine 200/lamotrigine  mg nightly.  He did have a few bouts of vertigo but was once diagnosed with fluid in the ears.  Was told that his blood work was within normal limits.  I interrogated his VNS today and maintained him on the same settings-output current of 1.75 with magnet current of 2, frequency of 20 Hz, pulse width of 254 both normal and magnet, on time of 21 seconds for regular and 30 seconds for magnet, off time of 1.1-minute, battery life of 25 to 50% with lead impedance of 2483 ohms. .      Last visit-he denies having any seizures since his last visit.  His last seizure was in August 2021.  He has swiped his VNS magnet a few times.  He continues to take Tegretol- mg, 3 tablets twice daily and lamotrigine 200 mg in the morning with lamotrigine  mg nightly.  He continues to work at [employer].  I interrogated his VNS today and maintained him at the same settings-output current of 1.75 with magnet current of 2, frequency of 20 Hz, pulse width of 254 both normal and magnet, on time of 21 seconds for regular and 30 seconds for magnet, off time of 1.1-minute, battery life of 25 to 50% with lead impedance of 253 2 ohms.  He states that since his off time was increased the pain in his neck has subsided.      Last visit-patient states he has not had any seizures since last year August.  He also denies having any dizziness unless his allergies act up.  He continues to take Tegretol- mg twice daily in addition to lamotrigine 200 mg in the morning and lamotrigine  mg nightly.  He tells me today that he has been having severe hoarseness of voice every  time his VNS goes off.  I interrogated his VNS today and increased his off time. Output current was 1.75 with frequency of 20 Hz, pulse width of 250, on time of 21 seconds and off time was changed from 0.5-1.1 minutes.  Magnet current was 2 with pulse width of 250 and on time of 30 seconds.  Lead impedance was 2419 ohms with a battery life of 50 to 75% and magnet activations of 623.    Last visit-he denies having any seizures since his last visit.  In fact his last seizure was in August of last year.  He has increased his Tegretol-XR to 600 mg twice a day and is tolerating it well.  Also continues to take regular lamotrigine 200 mg in the morning and lamotrigine  mg at night.  We interrogated his VNS today and maintained him at the same settings.  Output current was 1.75 with frequency of 20 Hz, pulse width of 250, on time of 21 seconds and off time of 0.5 minutes.  Magnet current was 2 with pulse width of 250 and on time of 30 seconds.  Lead impedance was 2548 ohms with a battery life of 50 to 75% and magnet activations of 621.  He also states that his mood has been well controlled as he has a great family/friends support something that he has relied on since the death of his .  He also tries to keep himself busy by working on projects around the house.  Last month he did sustain a tick bite.  After that he was checked for Lyme's disease which was negative but he was diagnosed with [place] spotted fever and treated with antibiotics.  Currently denies any symptoms.    Last visit-patient states that he had about 4-5 seizures at the end of August as he was extremely stressed out because his  passed away.  With his episodes he felt buzzing sound in his ear/a feeling of paranoia followed by losing awareness.  Denies having any shaking episodes.  He continues to be compliant with his medications and did increase his Tegretol-XR to 400 mg in the morning and 600 milligrams at night in addition to  lamotrigine 200/lamotrigine  mg.  He has only swiped his VNS magnet once in the past few months.  Today I interrogated his VNS and changed 3 of his settings.  Output current was increased to 1.75, frequency maintained at 20 Hz with pulse width of 250, on time was increased to 21 seconds and off time maintained at 0.5 minutes.  Magnet mode current was increased to 2 with a pulse rate of 250 and on time of 30 seconds.  Tachycardia detection not available.  Battery life is still 50 to 75% with an impedance of 2693.    Last visit-patient denies having any seizures since the last visit.  He has also not had any of his typical auras of hearing buzzing sound or feeling paranoid.  His last Tegretol level was 3.5 (normal 4-12), lamotrigine level was 6 (2-20) and sodium was 141.  After that I told him to increase his dose of Tegretol-XR however he has continued to take his previous dose of 400 mg twice a day.  He also continues to take regular lamotrigine 200 mg in addition to lamotrigine  mg at night.  I interrogated his VNS today and his settings were maintained the same.  Output current of 1.5, frequency of 20 Hz, pulse width of 250, on time of 7 seconds and of time of 0.5 minutes.  Magnet mode current was 1.75 with a pulse width of 250 and on time of 30 seconds.  Tachycardia detection not available as this is the older model.  Battery life of 50 to 75% with resistance of 2677.  Last magnet swipe was in June.    Last visit-patient states he has not had any seizures since his last visit.  In fact his last episode was in September 11. But he has had a few episodes of buzzing in his ears however he did not lose awareness with the spells.  He increased his lamotrigine and is now taking lamotrigine  mg along with regular lamotrigine 200 mg daily.  Also takes Tegretol- mg twice a day.  He is also trying to get adequate sleep at night now.    Last VIisit- since the last visit patient has had 1 seizure this  past Friday (sept 11)  he was extremely sleep deprived as he was awake the whole night prior to that taking care of his mother who has recently been diagnosed with dementia.  With this episode he felt an aura of paranoia followed by partial loss of awareness where he felt he was repeating  his movements followed by his hearing getting loud.  He continues to take Tegretol- mg twice daily and lamotrigine ER to 50 mg in the morning along with regular lamotrigine 200 mg.  His last levels of Tegretol were 6 (normal 4-12) and lamotrigine was 4.4 (2-20).  I also interrogated his VNS today and his settings were as follows out put current 1.5, frequency 20 Hz, pulse width 250, on time 7 seconds, off time 0.5 minutes.  Magnet mode current was 1.75 with pulse width of 250 and on time of 30 seconds.  We did not change his settings.    Last visit- since the last visit patient states that he has had a few episodes which he attributes to stress at work (works at [employer]).  One episode was during sleep witnessed by his  when he was sleeping on the couch when he suddenly sat straight up and tried scooting down.  Was also trying to reach for objects.  This lasted for a minute and he went back to sleep later.  He has also had 2-3 episodes at work when he feels it coming on ( as the sounds around him get louder)  but does not lose awareness.  He either swipes his magnet or takes an extra dose of his medication at that time.  Using his VNS magnet still causes slight pain in his cheek.  In addition patient has also started to have auditory hallucinations where he hears the sound of crickets and bugs.  This is pulsatile in nature and coincides with the beating of his heart at times.    Last visit-patient comes in today accompanied by his partner.  He has not had any seizures since he last saw me.  At his last visit I had increased his dose of lamotrigine  mg daily.  He also continues to take regular Lamictal 200 mg  daily in addition to Tegretol- mg daily.  We interrogated his VNS today and the seettings were as follows- output current 1.5, frequency 20 Hz, pulse width 250, on time 7 seconds, off time 0.5 minutes.  Magnet mode current was 1.75 with pulse width of 250 and on time of 30 seconds.  We did not change the settings.  Patient has been swiping the magnet at least a few times each month.    Last visit-this is a patient previously seen by Dr. Guerra for seizures.  As per patient he started having seizures at age 12.  With his events he has a paranoid feeling with sounds getting loud followed by loss of awareness where he stares off into space.  He has also had repetitive right arm movements with some of his seizures.  Denies any episodes with whole body shaking.  He had an EEG with  in 2004 that showed bitemporal slow wave activity more prominent on the left and a video EEG that showed probable frontal lobe onset.  He has tried several medications in the past including phenobarbital, phenytoin, Topamax and Keppra.  Currently takes Tegretol- mg in the morning and lamotrigine 200 mg along with lamotrigine ER 50 mg, 4 tablets daily.  Was supposed to take twice daily doses for both of them but takes just the morning dose.  His last levels in 2018 were-Lamictal-7, Tegretol-6.2.  Since his last visit in December he has had 2 seizures with one seizure being last week.  He also had a VNS placed in 2013.  Patient still gets hoarseness of voice while using the magnet.  We interrogated the VNS today.  Settings were as follows- output current 1.5, frequency 20 Hz, pulse width 250, on time 7 seconds, off time 0.5 minutes.  Magnet mode current was 1.75 with pulse width of 250 and on time of 60 seconds.  We reduced the magnet on time to 30 seconds.  We have also advised him to swipe the magnet at least once a month.  Of note-I reviewed Dr. Guerra's notes as follows-    Patient followed for several years for  epilepsy starting around age 12. Has had evaluation by Dr. Klein at  including EEG in 2004 showing bitemporal focal slow wave activity more prominent on the left. VEEG showed probable frontal lobe onset. Seizures start with paranoid feeling, then everything sounds loud, then sometimes body movement. Aware with some and not others. Recalls one where pushed back against couch, and right arm extended up in air. Usually arms or trunk or whole body movement; used to grit teeth. Has some where whole body tenses up, exhausted afterwards. Many in sleep.       Also has common migraines.  Has used PHB, PHT, Tegretol-XR and generic carbamazepine XR, has not tolerated TPM or LVT.  9/13 started LTG XR to total dose 400mg am. Reported no side effects on LTG ER but prefers taking LTG 200mg bid and LTG ER 50mg 4 qam.  Reported best month probably 5-6 seizures while awake (more in sleep), in a bad month -- many.     model placed 5/15. Noted no benefit, so 2/16 changed to rapid cycling, off time 1.1min, on time to 7 seconds.   9/16 Past couple months just total of 2-3 while awake (estimates 30% better), but many at night. Still neck pain with magnet.  Decreased off time to 0.8 minutes, no change on time of 7 seconds or current of 1.5mamp.   Decreased magnet PW to 250 and tolerated that well in the office.   10/17: Seizures pretty good overall -- 2-3 daytime sz in over 3 months, not many nocturnal either. Painful twinge in neck with magnet , so decreased to 130, increased magnet on time back to 60 seconds. Normal mode 1.5m amp, , on 7 sec off 0.8 minutes.  5/18: has been using the magnet to get used to it, and more tolerable. Magnet usually but does not always work. Increased magnet to 250PW.   8/18: doing well, one seizure he can think of in past couple mos. Changed dosing after forgot afternoon doses one day: taking only morning meds: CBZ XR 200mg 2 tabs, LTG 200mg and LTG ER 50mgx4. When he gets home from  work, if feels sz coming, will take an extra CBZ XR, tolerates it well, and uses magnet if it continues. Believes no increase in night time szs. Using the magnet, still gets twinge and eye runs, but tolerable.   Today: feels near seizures in evenings eg 3-4x/mo (phi around Black Friday), takes extra CBZ pill, no LOC. No missed days of work. No known nocturnal seizures. Magnet use still causes cough, mild twinge. Took dose of meds about 6:30 this am.   No headaches lately. Has a cold, o/w well.    The following portions of the patient's history were reviewed today and updated as of 12/09/2019  : allergies, current medications, past family history, past medical history, past social history, past surgical history and problem list  These document will be scanned to patient's chart.      Current Outpatient Medications:     aspirin 81 MG EC tablet, Take 1 tablet by mouth Daily., Disp: , Rfl:     carBAMazepine XR (TEGretol  XR) 200 MG 12 hr tablet, TAKE 3 TABLETS BY MOUTH TWICE DAILY, Disp: 540 tablet, Rfl: 3    fluticasone (FLONASE) 50 MCG/ACT nasal spray, , Disp: , Rfl:     ibuprofen (ADVIL,MOTRIN) 600 MG tablet, Take 1 tablet by mouth Every 6 (Six) Hours As Needed for Mild Pain., Disp: , Rfl:     lamoTRIgine (LaMICtal) 200 MG tablet, Take 1 tablet by mouth Daily., Disp: 90 tablet, Rfl: 3    lamoTRIgine  MG tablet sustained-release 24 hour, Take 1 tablet by mouth Daily., Disp: 90 tablet, Rfl: 3    SUMAtriptan (IMITREX) 100 MG tablet, Take 1 tablet by mouth 1 (One) Time As Needed for Migraine for up to 1 dose., Disp: 9 tablet, Rfl: 11   Past Medical History:   Diagnosis Date    Complex partial seizure     Headache, migraine     Headache, tension-type Aug. 2022    HL (hearing loss) 1994    Vision loss 2000      History reviewed. No pertinent surgical history.   Family History   Problem Relation Age of Onset    Alzheimer's disease Other     Dementia Other     Cancer Other         aunt, uncle    Dementia Mother      "Stroke Mother       Social History     Socioeconomic History    Marital status:    Tobacco Use    Smoking status: Every Day     Packs/day: 1.00     Years: 43.00     Additional pack years: 0.00     Total pack years: 43.00     Types: Cigarettes     Start date: 7/8/1980     Passive exposure: Current    Smokeless tobacco: Never   Vaping Use    Vaping Use: Never used   Substance and Sexual Activity    Alcohol use: No    Drug use: Never    Sexual activity: Not Currently     Partners: Male     Birth control/protection: Condom, Same-sex partner     Review of Systems   Neurological: Positive for dizziness.   All other systems reviewed and are negative.      Objective:    BP (!) 160/102   Pulse 64   Ht 172.7 cm (68\")   Wt 86.6 kg (191 lb)   SpO2 98%   BMI 29.04 kg/m²     Neurology Exam: Reviewed and remains unchanged    General apperance: NAD.     Mental status: Alert, awake and oriented to time place and person.    Recent and Remote memory: Intact.    Attention span and Concentration: Normal.     Language and Speech: Intact- No dysarthria.    Fluency, Naming , Repitition and Comprehension:  Intact    Cranial Nerves:   CN II: Visual fields are full. Intact. Fundi - Normal, No papillederma, Pupils - ISAAC  CN III, IV and VI: Extraocular movements are intact. Normal saccades.   CN V: Facial sensation is intact.   CN VII: Muscles of facial expression reveal no asymmetry. Intact.   CN VIII: Hearing is intact. Whispered voice intact.   CN IX and X: Palate elevates symmetrically. Intact  CN XI: Shoulder shrug is intact.   CN XII: Tongue is midline without evidence of atrophy or fasciculation.     Ophthalmoscopic exam of optic disc-normal    Motor:  Right UE muscle strength 5/5. Normal tone.     Left UE muscle strength 5/5. Normal tone.      Right LE muscle strength5/5. Normal tone.     Left LE muscle strength 5/5. Normal tone.      Sensory: Normal light touch, vibration and pinprick sensation bilaterally.    DTRs: 2+ " bilaterally in upper and lower extremities.    Babinski: Negative bilaterally.    Co-ordination: Normal finger-to-nose, heel to shin B/L.    Rhomberg: Negative.    Gait: Normal.    Cardiovascular: Regular rate and rhythm without murmur, gallop or rub.    Assessment and Plan:  1. Partial symptomatic epilepsy with complex partial seizures, intractable, without status epilepticus (CMS/HCC)  Patient most likely has focal seizures from the left hemisphere  I have told him to continue Tegretol-XR  600 mg twice a day and lamotrigine 200 mg daily along with lamotrigine  mg daily  I interrogated his VNS.  I had previously discussed getting a newer VNS model with the auto stim feature however patient was reluctant because of a higher co-pay.  Therefore we will just get a battery change with his existing VNS model in the future  Counseled on seizure precautions.  Of note-he will talk to his PCP about getting a sodium level and we will send over the results to us        Return in about 9 months (around 11/19/2024).   I spent 25 minutes out of it 20 minutes were spent face-to-face.    Poppy Olivares MD

## 2024-03-06 ENCOUNTER — TELEPHONE (OUTPATIENT)
Dept: NEUROLOGY | Facility: CLINIC | Age: 59
End: 2024-03-06
Payer: COMMERCIAL

## 2024-03-06 NOTE — TELEPHONE ENCOUNTER
Spoke with Servando to relay results. PCP is aware of cholesterol and they are working on it. Will call back if any questions or concerns before next appt in [place]

## 2024-03-06 NOTE — TELEPHONE ENCOUNTER
----- Message from Poppy Olivares MD sent at 3/4/2024  4:58 PM EST -----  Please let the patient know that I received his blood work and his sodium level was normal.  His cholesterol is elevated and if he is not taking any medications he should speak to his primary care doctor about starting 1.

## 2024-03-27 ENCOUNTER — TELEPHONE (OUTPATIENT)
Dept: NEUROLOGY | Facility: CLINIC | Age: 59
End: 2024-03-27
Payer: COMMERCIAL

## 2024-03-27 NOTE — TELEPHONE ENCOUNTER
Caller: DAIANA    Relationship: VNS - NURSE     Best call back number: [phone]    What is the best time to reach you:     Who are you requesting to speak with (clinical staff, provider,  specific staff member):   DR JOO MENDOZA    Do you know the name of the person who called:     What was the call regarding:   VNS BATTERY REPLACEMENT AND COST CONCERNS    PLEASE CALL KELLY BACK TO GIVE HER AN UPDATE ON THE STATUS OF THIS REFERRAL.

## 2024-04-02 NOTE — TELEPHONE ENCOUNTER
Sent text and email to Chrissy and awaiting response in regards to VNS battery replacement. Chrissy is out of the office until April 5.

## 2024-11-25 ENCOUNTER — OFFICE VISIT (OUTPATIENT)
Dept: NEUROLOGY | Facility: CLINIC | Age: 59
End: 2024-11-25
Payer: COMMERCIAL

## 2024-11-25 VITALS
BODY MASS INDEX: 28.95 KG/M2 | OXYGEN SATURATION: 99 % | HEIGHT: 68 IN | WEIGHT: 191 LBS | DIASTOLIC BLOOD PRESSURE: 98 MMHG | HEART RATE: 77 BPM | SYSTOLIC BLOOD PRESSURE: 144 MMHG

## 2024-11-25 DIAGNOSIS — G40.219 PARTIAL SYMPTOMATIC EPILEPSY WITH COMPLEX PARTIAL SEIZURES, INTRACTABLE, WITHOUT STATUS EPILEPTICUS: Primary | ICD-10-CM

## 2024-11-25 PROCEDURE — 99213 OFFICE O/P EST LOW 20 MIN: CPT | Performed by: PSYCHIATRY & NEUROLOGY

## 2024-11-25 RX ORDER — AMLODIPINE BESYLATE 5 MG/1
1 TABLET ORAL DAILY
COMMUNITY
Start: 2024-11-12

## 2024-11-25 NOTE — PROGRESS NOTES
Subjective:    CC: Servando Estrada is seen today  for Partial symptomatic epilepsy with complex partial seizures,       HPI:  Current visit-patient denies having any seizure-like episodes after his last visit.  In fact his last seizure was in August 2021 when he was under a lot of stress due to losing his .  He continues to take Tegretol- mg twice daily in addition to lamotrigine.  His last sodium level was 141, LDL was 136 (he is making dietary modifications).  I interrogated his VNS today and maintained him at same settings-output current of 1.75 with magnet current of 2, frequency of 20 Hz, pulse width of 250 for normal and magnet, on time of 21 seconds for regular and 30 seconds for magnet, off time of 1.1-minute, battery life of 25 to 50% with lead impedance of 2451 ohms. .    Last visit-patient denies having any seizures since his last visit.  He lives at home by himself but has not felt any episodes of confusion.  He continues to take Tegretol- mg twice daily in addition to lamotrigine 200/lamotrigine  mg nightly.  He did have a few bouts of vertigo but was once diagnosed with fluid in the ears.  Was told that his blood work was within normal limits.  I interrogated his VNS today and maintained him on the same settings-output current of 1.75 with magnet current of 2, frequency of 20 Hz, pulse width of 254 both normal and magnet, on time of 21 seconds for regular and 30 seconds for magnet, off time of 1.1-minute, battery life of 25 to 50% with lead impedance of 2483 ohms. .      Last visit-he denies having any seizures since his last visit.  His last seizure was in August 2021.  He has swiped his VNS magnet a few times.  He continues to take Tegretol- mg, 3 tablets twice daily and lamotrigine 200 mg in the morning with lamotrigine  mg nightly.  He continues to work at [employer].  I interrogated his VNS today and maintained him at the same settings-output current of 1.75 with  magnet current of 2, frequency of 20 Hz, pulse width of 254 both normal and magnet, on time of 21 seconds for regular and 30 seconds for magnet, off time of 1.1-minute, battery life of 25 to 50% with lead impedance of 253 2 ohms.  He states that since his off time was increased the pain in his neck has subsided.      Last visit-patient states he has not had any seizures since last year August.  He also denies having any dizziness unless his allergies act up.  He continues to take Tegretol- mg twice daily in addition to lamotrigine 200 mg in the morning and lamotrigine  mg nightly.  He tells me today that he has been having severe hoarseness of voice every time his VNS goes off.  I interrogated his VNS today and increased his off time. Output current was 1.75 with frequency of 20 Hz, pulse width of 250, on time of 21 seconds and off time was changed from 0.5-1.1 minutes.  Magnet current was 2 with pulse width of 250 and on time of 30 seconds.  Lead impedance was 2419 ohms with a battery life of 50 to 75% and magnet activations of 623.    Last visit-he denies having any seizures since his last visit.  In fact his last seizure was in August of last year.  He has increased his Tegretol-XR to 600 mg twice a day and is tolerating it well.  Also continues to take regular lamotrigine 200 mg in the morning and lamotrigine  mg at night.  We interrogated his VNS today and maintained him at the same settings.  Output current was 1.75 with frequency of 20 Hz, pulse width of 250, on time of 21 seconds and off time of 0.5 minutes.  Magnet current was 2 with pulse width of 250 and on time of 30 seconds.  Lead impedance was 2548 ohms with a battery life of 50 to 75% and magnet activations of 621.  He also states that his mood has been well controlled as he has a great family/friends support something that he has relied on since the death of his .  He also tries to keep himself busy by working on projects  around the house.  Last month he did sustain a tick bite.  After that he was checked for Lyme's disease which was negative but he was diagnosed with [place] spotted fever and treated with antibiotics.  Currently denies any symptoms.    Last visit-patient states that he had about 4-5 seizures at the end of August as he was extremely stressed out because his  passed away.  With his episodes he felt buzzing sound in his ear/a feeling of paranoia followed by losing awareness.  Denies having any shaking episodes.  He continues to be compliant with his medications and did increase his Tegretol-XR to 400 mg in the morning and 600 milligrams at night in addition to lamotrigine 200/lamotrigine  mg.  He has only swiped his VNS magnet once in the past few months.  Today I interrogated his VNS and changed 3 of his settings.  Output current was increased to 1.75, frequency maintained at 20 Hz with pulse width of 250, on time was increased to 21 seconds and off time maintained at 0.5 minutes.  Magnet mode current was increased to 2 with a pulse rate of 250 and on time of 30 seconds.  Tachycardia detection not available.  Battery life is still 50 to 75% with an impedance of 2693.    Last visit-patient denies having any seizures since the last visit.  He has also not had any of his typical auras of hearing buzzing sound or feeling paranoid.  His last Tegretol level was 3.5 (normal 4-12), lamotrigine level was 6 (2-20) and sodium was 141.  After that I told him to increase his dose of Tegretol-XR however he has continued to take his previous dose of 400 mg twice a day.  He also continues to take regular lamotrigine 200 mg in addition to lamotrigine  mg at night.  I interrogated his VNS today and his settings were maintained the same.  Output current of 1.5, frequency of 20 Hz, pulse width of 250, on time of 7 seconds and of time of 0.5 minutes.  Magnet mode current was 1.75 with a pulse width of 250 and on  time of 30 seconds.  Tachycardia detection not available as this is the older model.  Battery life of 50 to 75% with resistance of 2677.  Last magnet swipe was in June.    Last visit-patient states he has not had any seizures since his last visit.  In fact his last episode was in September 11. But he has had a few episodes of buzzing in his ears however he did not lose awareness with the spells.  He increased his lamotrigine and is now taking lamotrigine  mg along with regular lamotrigine 200 mg daily.  Also takes Tegretol- mg twice a day.  He is also trying to get adequate sleep at night now.    Last VIisit- since the last visit patient has had 1 seizure this past Friday (sept 11)  he was extremely sleep deprived as he was awake the whole night prior to that taking care of his mother who has recently been diagnosed with dementia.  With this episode he felt an aura of paranoia followed by partial loss of awareness where he felt he was repeating  his movements followed by his hearing getting loud.  He continues to take Tegretol- mg twice daily and lamotrigine ER to 50 mg in the morning along with regular lamotrigine 200 mg.  His last levels of Tegretol were 6 (normal 4-12) and lamotrigine was 4.4 (2-20).  I also interrogated his VNS today and his settings were as follows out put current 1.5, frequency 20 Hz, pulse width 250, on time 7 seconds, off time 0.5 minutes.  Magnet mode current was 1.75 with pulse width of 250 and on time of 30 seconds.  We did not change his settings.    Last visit- since the last visit patient states that he has had a few episodes which he attributes to stress at work (works at [employer]).  One episode was during sleep witnessed by his  when he was sleeping on the couch when he suddenly sat straight up and tried scooting down.  Was also trying to reach for objects.  This lasted for a minute and he went back to sleep later.  He has also had 2-3 episodes at work when he  feels it coming on ( as the sounds around him get louder)  but does not lose awareness.  He either swipes his magnet or takes an extra dose of his medication at that time.  Using his VNS magnet still causes slight pain in his cheek.  In addition patient has also started to have auditory hallucinations where he hears the sound of crickets and bugs.  This is pulsatile in nature and coincides with the beating of his heart at times.    Last visit-patient comes in today accompanied by his partner.  He has not had any seizures since he last saw me.  At his last visit I had increased his dose of lamotrigine  mg daily.  He also continues to take regular Lamictal 200 mg daily in addition to Tegretol- mg daily.  We interrogated his VNS today and the seettings were as follows- output current 1.5, frequency 20 Hz, pulse width 250, on time 7 seconds, off time 0.5 minutes.  Magnet mode current was 1.75 with pulse width of 250 and on time of 30 seconds.  We did not change the settings.  Patient has been swiping the magnet at least a few times each month.    Last visit-this is a patient previously seen by Dr. Guerra for seizures.  As per patient he started having seizures at age 12.  With his events he has a paranoid feeling with sounds getting loud followed by loss of awareness where he stares off into space.  He has also had repetitive right arm movements with some of his seizures.  Denies any episodes with whole body shaking.  He had an EEG with  in 2004 that showed bitemporal slow wave activity more prominent on the left and a video EEG that showed probable frontal lobe onset.  He has tried several medications in the past including phenobarbital, phenytoin, Topamax and Keppra.  Currently takes Tegretol- mg in the morning and lamotrigine 200 mg along with lamotrigine ER 50 mg, 4 tablets daily.  Was supposed to take twice daily doses for both of them but takes just the morning dose.  His last  levels in 2018 were-Lamictal-7, Tegretol-6.2.  Since his last visit in December he has had 2 seizures with one seizure being last week.  He also had a VNS placed in 2013.  Patient still gets hoarseness of voice while using the magnet.  We interrogated the VNS today.  Settings were as follows- output current 1.5, frequency 20 Hz, pulse width 250, on time 7 seconds, off time 0.5 minutes.  Magnet mode current was 1.75 with pulse width of 250 and on time of 60 seconds.  We reduced the magnet on time to 30 seconds.  We have also advised him to swipe the magnet at least once a month.  Of note-I reviewed Dr. Guerra's notes as follows-    Patient followed for several years for epilepsy starting around age 12. Has had evaluation by Dr. Klein at  including EEG in 2004 showing bitemporal focal slow wave activity more prominent on the left. VEEG showed probable frontal lobe onset. Seizures start with paranoid feeling, then everything sounds loud, then sometimes body movement. Aware with some and not others. Recalls one where pushed back against couch, and right arm extended up in air. Usually arms or trunk or whole body movement; used to grit teeth. Has some where whole body tenses up, exhausted afterwards. Many in sleep.       Also has common migraines.  Has used PHB, PHT, Tegretol-XR and generic carbamazepine XR, has not tolerated TPM or LVT.  9/13 started LTG XR to total dose 400mg am. Reported no side effects on LTG ER but prefers taking LTG 200mg bid and LTG ER 50mg 4 qam.  Reported best month probably 5-6 seizures while awake (more in sleep), in a bad month -- many.     model placed 5/15. Noted no benefit, so 2/16 changed to rapid cycling, off time 1.1min, on time to 7 seconds.   9/16 Past couple months just total of 2-3 while awake (estimates 30% better), but many at night. Still neck pain with magnet.  Decreased off time to 0.8 minutes, no change on time of 7 seconds or current of 1.5mamp.   Decreased  magnet PW to 250 and tolerated that well in the office.   10/17: Seizures pretty good overall -- 2-3 daytime sz in over 3 months, not many nocturnal either. Painful twinge in neck with magnet , so decreased to 130, increased magnet on time back to 60 seconds. Normal mode 1.5m amp, , on 7 sec off 0.8 minutes.  5/18: has been using the magnet to get used to it, and more tolerable. Magnet usually but does not always work. Increased magnet to 250PW.   8/18: doing well, one seizure he can think of in past couple mos. Changed dosing after forgot afternoon doses one day: taking only morning meds: CBZ XR 200mg 2 tabs, LTG 200mg and LTG ER 50mgx4. When he gets home from work, if feels sz coming, will take an extra CBZ XR, tolerates it well, and uses magnet if it continues. Believes no increase in night time szs. Using the magnet, still gets twinge and eye runs, but tolerable.   Today: feels near seizures in evenings eg 3-4x/mo (phi around Black Friday), takes extra CBZ pill, no LOC. No missed days of work. No known nocturnal seizures. Magnet use still causes cough, mild twinge. Took dose of meds about 6:30 this am.   No headaches lately. Has a cold, o/w well.    The following portions of the patient's history were reviewed today and updated as of 12/09/2019  : allergies, current medications, past family history, past medical history, past social history, past surgical history and problem list  These document will be scanned to patient's chart.      Current Outpatient Medications:     amLODIPine (NORVASC) 5 MG tablet, Take 1 tablet by mouth Daily., Disp: , Rfl:     aspirin 81 MG EC tablet, Take 1 tablet by mouth Daily., Disp: , Rfl:     carBAMazepine XR (TEGretol  XR) 200 MG 12 hr tablet, TAKE 3 TABLETS BY MOUTH TWICE DAILY, Disp: 540 tablet, Rfl: 3    fluticasone (FLONASE) 50 MCG/ACT nasal spray, , Disp: , Rfl:     ibuprofen (ADVIL,MOTRIN) 600 MG tablet, Take 1 tablet by mouth Every 6 (Six) Hours As Needed for  "Mild Pain., Disp: , Rfl:     lamoTRIgine (LaMICtal) 200 MG tablet, Take 1 tablet by mouth Daily., Disp: 90 tablet, Rfl: 3    lamoTRIgine  MG tablet sustained-release 24 hour, Take 1 tablet by mouth Daily., Disp: 90 tablet, Rfl: 3    SUMAtriptan (IMITREX) 100 MG tablet, Take 1 tablet by mouth 1 (One) Time As Needed for Migraine for up to 1 dose., Disp: 9 tablet, Rfl: 11   Past Medical History:   Diagnosis Date    Complex partial seizure     Headache, migraine     Headache, tension-type Aug. 2022    HL (hearing loss) 1994    Vision loss 2000      History reviewed. No pertinent surgical history.   Family History   Problem Relation Age of Onset    Alzheimer's disease Other     Dementia Other     Cancer Other         aunt, uncle    Dementia Mother     Stroke Mother       Social History     Socioeconomic History    Marital status:    Tobacco Use    Smoking status: Every Day     Current packs/day: 1.00     Average packs/day: 1 pack/day for 44.4 years (44.4 ttl pk-yrs)     Types: Cigarettes     Start date: 7/8/1980     Passive exposure: Current    Smokeless tobacco: Never   Vaping Use    Vaping status: Never Used   Substance and Sexual Activity    Alcohol use: No    Drug use: Never    Sexual activity: Not Currently     Partners: Male     Birth control/protection: Condom, None, Partner of same sex     Review of Systems   Neurological: Positive for dizziness.   All other systems reviewed and are negative.      Objective:    /98   Pulse 77   Ht 172.7 cm (68\")   Wt 86.6 kg (191 lb)   SpO2 99%   BMI 29.04 kg/m²     Neurology Exam: Reviewed and remains unchanged    General apperance: NAD.     Mental status: Alert, awake and oriented to time place and person.    Recent and Remote memory: Intact.    Attention span and Concentration: Normal.     Language and Speech: Intact- No dysarthria.    Fluency, Naming , Repitition and Comprehension:  Intact    Cranial Nerves:   CN II: Visual fields are full. Intact. " Fundi - Normal, No papillederma, Pupils - ISAAC  CN III, IV and VI: Extraocular movements are intact. Normal saccades.   CN V: Facial sensation is intact.   CN VII: Muscles of facial expression reveal no asymmetry. Intact.   CN VIII: Hearing is intact. Whispered voice intact.   CN IX and X: Palate elevates symmetrically. Intact  CN XI: Shoulder shrug is intact.   CN XII: Tongue is midline without evidence of atrophy or fasciculation.     Ophthalmoscopic exam of optic disc-normal    Motor:  Right UE muscle strength 5/5. Normal tone.     Left UE muscle strength 5/5. Normal tone.      Right LE muscle strength5/5. Normal tone.     Left LE muscle strength 5/5. Normal tone.      Sensory: Normal light touch, vibration and pinprick sensation bilaterally.    DTRs: 2+ bilaterally in upper and lower extremities.    Babinski: Negative bilaterally.    Co-ordination: Normal finger-to-nose, heel to shin B/L.    Rhomberg: Negative.    Gait: Normal.    Cardiovascular: Regular rate and rhythm without murmur, gallop or rub.    Assessment and Plan:  1. Partial symptomatic epilepsy with complex partial seizures, intractable, without status epilepticus (CMS/HCC)  Patient most likely has focal seizures from the left hemisphere  I have told him to continue Tegretol-XR  600 mg twice a day and lamotrigine 200 mg daily along with lamotrigine  mg daily  I interrogated his VNS.  Whenever patient needs a battery change he could get a newer VNS model with auto stim feature provided it is approved by insurance as last time his co-pay was extremely high   Counseled on seizure precautions.          Return in about 6 months (around 5/25/2025).       Poppy Olivares MD

## 2025-03-08 DIAGNOSIS — G40.219 PARTIAL SYMPTOMATIC EPILEPSY WITH COMPLEX PARTIAL SEIZURES, INTRACTABLE, WITHOUT STATUS EPILEPTICUS: ICD-10-CM

## 2025-03-10 RX ORDER — LAMOTRIGINE 300 MG/1
1 TABLET, EXTENDED RELEASE ORAL DAILY
Qty: 90 TABLET | Refills: 1 | Status: SHIPPED | OUTPATIENT
Start: 2025-03-10

## 2025-03-10 NOTE — TELEPHONE ENCOUNTER
Rx Refill Note  Requested Prescriptions     Pending Prescriptions Disp Refills    lamoTRIgine  MG tablet sustained-release 24 hour [Pharmacy Med Name: lamoTRIgine  MG Oral Tablet Extended Release 24 Hour] 90 tablet 0     Sig: Take 1 tablet by mouth once daily      Last filled:2/19/24 90ds 3 ref  Last office visit with prescribing clinician: 11/25/2024      Next office visit with prescribing clinician: 6/30/2025     Marija Mendoza MA  03/10/25, 10:25 EDT    Sent to pharmacy with 1 ref until upcoming appt

## 2025-05-15 NOTE — TELEPHONE ENCOUNTER
Rx Refill Note  Requested Prescriptions     Pending Prescriptions Disp Refills    lamoTRIgine (LaMICtal) 200 MG tablet [Pharmacy Med Name: lamoTRIgine 200 MG Oral Tablet] 90 tablet 0     Sig: Take 1 tablet by mouth once daily      Last filled:2/19/24 90ds 3 ref  Last office visit with prescribing clinician: 11/25/2024      Next office visit with prescribing clinician: 6/30/2025     Marija Mendoza MA  05/15/25, 07:46 EDT

## 2025-06-30 ENCOUNTER — TELEPHONE (OUTPATIENT)
Dept: NEUROLOGY | Facility: CLINIC | Age: 60
End: 2025-06-30

## 2025-06-30 ENCOUNTER — OFFICE VISIT (OUTPATIENT)
Dept: NEUROLOGY | Facility: CLINIC | Age: 60
End: 2025-06-30
Payer: COMMERCIAL

## 2025-06-30 VITALS
HEIGHT: 68 IN | HEART RATE: 66 BPM | OXYGEN SATURATION: 95 % | DIASTOLIC BLOOD PRESSURE: 92 MMHG | BODY MASS INDEX: 28.76 KG/M2 | SYSTOLIC BLOOD PRESSURE: 150 MMHG | WEIGHT: 189.8 LBS

## 2025-06-30 DIAGNOSIS — H81.11 BENIGN PAROXYSMAL POSITIONAL VERTIGO OF RIGHT EAR: ICD-10-CM

## 2025-06-30 DIAGNOSIS — Z96.89 S/P PLACEMENT OF VNS (VAGUS NERVE STIMULATION) DEVICE: ICD-10-CM

## 2025-06-30 DIAGNOSIS — G40.219 PARTIAL SYMPTOMATIC EPILEPSY WITH COMPLEX PARTIAL SEIZURES, INTRACTABLE, WITHOUT STATUS EPILEPTICUS: Primary | ICD-10-CM

## 2025-06-30 PROCEDURE — 99214 OFFICE O/P EST MOD 30 MIN: CPT | Performed by: PSYCHIATRY & NEUROLOGY

## 2025-06-30 RX ORDER — LAMOTRIGINE 300 MG/1
1 TABLET, EXTENDED RELEASE ORAL DAILY
Qty: 90 TABLET | Refills: 3 | Status: SHIPPED | OUTPATIENT
Start: 2025-06-30

## 2025-06-30 RX ORDER — LAMOTRIGINE 200 MG/1
200 TABLET ORAL DAILY
Qty: 90 TABLET | Refills: 0 | OUTPATIENT
Start: 2025-06-30

## 2025-06-30 RX ORDER — SILODOSIN 4 MG/1
CAPSULE ORAL
COMMUNITY
Start: 2025-06-28

## 2025-06-30 RX ORDER — LAMOTRIGINE 200 MG/1
200 TABLET ORAL DAILY
Qty: 90 TABLET | Refills: 3 | Status: SHIPPED | OUTPATIENT
Start: 2025-06-30

## 2025-06-30 RX ORDER — CARBAMAZEPINE 200 MG/1
TABLET, EXTENDED RELEASE ORAL
Qty: 540 TABLET | Refills: 3 | Status: SHIPPED | OUTPATIENT
Start: 2025-06-30

## 2025-06-30 RX ORDER — MECLIZINE HCL 12.5 MG 12.5 MG/1
12.5 TABLET ORAL 3 TIMES DAILY PRN
Qty: 30 TABLET | Refills: 3 | Status: SHIPPED | OUTPATIENT
Start: 2025-06-30

## 2025-06-30 NOTE — PROGRESS NOTES
Subjective:    CC: Servando Estrada is seen today  for Follow-up (Partial symptomatic epilepsy with complex partial seizures, intractable, without status epilepticus)       HPI:  Current visit-patient denies having any seizures since he last saw me with his last episode being in August 2021 however he had about 7-8 consecutive episodes of dizziness/lightheadedness along with balance issues worse while looking to the right.  On one of the days his symptoms were so bad that his sister had to come pick him up from work.  These have now subsided.  He continues to take Tegretol- mg twice daily in addition to lamotrigine 200 mg in the morning and lamotrigine  mg at night.  He is keeping himself well-hydrated.  His blood pressure does run high at times like today.  He had blood work recently after which he was told that he is slightly anemic.  I also interrogated his VNS today and his battery life has reduced to 11 to 25% with lead impedance of 2370 and magnet stems of over 600.  Output current was 1.75 with frequency of 20 Hz, pulse width of 250, on time of 21 seconds, off time of 1.1 minutes.  Magnet current was 2 with pulse width of 250 and on time of 30 seconds, off time of 1.1 minutes.  He has also been having some mild short-term memory problems marked by forgetfulness.  He is interested in seeing a neurologist in [place] as that would be closer to his house (lives 2-1/2 hours away).    Patient denies having any seizure-like episodes after his last visit.  In fact his last seizure was in August 2021 when he was under a lot of stress due to losing his .  He continues to take Tegretol- mg twice daily in addition to lamotrigine.  His last sodium level was 141, LDL was 136 (he is making dietary modifications).  I interrogated his VNS today and maintained him at same settings-output current of 1.75 with magnet current of 2, frequency of 20 Hz, pulse width of 250 for normal and magnet, on time of  21 seconds for regular and 30 seconds for magnet, off time of 1.1-minute, battery life of 25 to 50% with lead impedance of 2451 ohms. .    Last visit-patient denies having any seizures since his last visit.  He lives at home by himself but has not felt any episodes of confusion.  He continues to take Tegretol- mg twice daily in addition to lamotrigine 200/lamotrigine  mg nightly.  He did have a few bouts of vertigo but was once diagnosed with fluid in the ears.  Was told that his blood work was within normal limits.  I interrogated his VNS today and maintained him on the same settings-output current of 1.75 with magnet current of 2, frequency of 20 Hz, pulse width of 254 both normal and magnet, on time of 21 seconds for regular and 30 seconds for magnet, off time of 1.1-minute, battery life of 25 to 50% with lead impedance of 2483 ohms. .      Last visit-he denies having any seizures since his last visit.  His last seizure was in August 2021.  He has swiped his VNS magnet a few times.  He continues to take Tegretol- mg, 3 tablets twice daily and lamotrigine 200 mg in the morning with lamotrigine  mg nightly.  He continues to work at [employer].  I interrogated his VNS today and maintained him at the same settings-output current of 1.75 with magnet current of 2, frequency of 20 Hz, pulse width of 254 both normal and magnet, on time of 21 seconds for regular and 30 seconds for magnet, off time of 1.1-minute, battery life of 25 to 50% with lead impedance of 253 2 ohms.  He states that since his off time was increased the pain in his neck has subsided.      Last visit-patient states he has not had any seizures since last year August.  He also denies having any dizziness unless his allergies act up.  He continues to take Tegretol- mg twice daily in addition to lamotrigine 200 mg in the morning and lamotrigine  mg nightly.  He tells me today that he has been having severe hoarseness of  voice every time his VNS goes off.  I interrogated his VNS today and increased his off time. Output current was 1.75 with frequency of 20 Hz, pulse width of 250, on time of 21 seconds and off time was changed from 0.5-1.1 minutes.  Magnet current was 2 with pulse width of 250 and on time of 30 seconds.  Lead impedance was 2419 ohms with a battery life of 50 to 75% and magnet activations of 623.    Last visit-he denies having any seizures since his last visit.  In fact his last seizure was in August of last year.  He has increased his Tegretol-XR to 600 mg twice a day and is tolerating it well.  Also continues to take regular lamotrigine 200 mg in the morning and lamotrigine  mg at night.  We interrogated his VNS today and maintained him at the same settings.  Output current was 1.75 with frequency of 20 Hz, pulse width of 250, on time of 21 seconds and off time of 0.5 minutes.  Magnet current was 2 with pulse width of 250 and on time of 30 seconds.  Lead impedance was 2548 ohms with a battery life of 50 to 75% and magnet activations of 621.  He also states that his mood has been well controlled as he has a great family/friends support something that he has relied on since the death of his .  He also tries to keep himself busy by working on projects around the house.  Last month he did sustain a tick bite.  After that he was checked for Lyme's disease which was negative but he was diagnosed with [place] spotted fever and treated with antibiotics.  Currently denies any symptoms.    Last visit-patient states that he had about 4-5 seizures at the end of August as he was extremely stressed out because his  passed away.  With his episodes he felt buzzing sound in his ear/a feeling of paranoia followed by losing awareness.  Denies having any shaking episodes.  He continues to be compliant with his medications and did increase his Tegretol-XR to 400 mg in the morning and 600 milligrams at night in  Normal vision: sees adequately in most situations; can see medication labels, newsprint addition to lamotrigine 200/lamotrigine  mg.  He has only swiped his VNS magnet once in the past few months.  Today I interrogated his VNS and changed 3 of his settings.  Output current was increased to 1.75, frequency maintained at 20 Hz with pulse width of 250, on time was increased to 21 seconds and off time maintained at 0.5 minutes.  Magnet mode current was increased to 2 with a pulse rate of 250 and on time of 30 seconds.  Tachycardia detection not available.  Battery life is still 50 to 75% with an impedance of 2693.    Last visit-patient denies having any seizures since the last visit.  He has also not had any of his typical auras of hearing buzzing sound or feeling paranoid.  His last Tegretol level was 3.5 (normal 4-12), lamotrigine level was 6 (2-20) and sodium was 141.  After that I told him to increase his dose of Tegretol-XR however he has continued to take his previous dose of 400 mg twice a day.  He also continues to take regular lamotrigine 200 mg in addition to lamotrigine  mg at night.  I interrogated his VNS today and his settings were maintained the same.  Output current of 1.5, frequency of 20 Hz, pulse width of 250, on time of 7 seconds and of time of 0.5 minutes.  Magnet mode current was 1.75 with a pulse width of 250 and on time of 30 seconds.  Tachycardia detection not available as this is the older model.  Battery life of 50 to 75% with resistance of 2677.  Last magnet swipe was in June.    Last visit-patient states he has not had any seizures since his last visit.  In fact his last episode was in September 11. But he has had a few episodes of buzzing in his ears however he did not lose awareness with the spells.  He increased his lamotrigine and is now taking lamotrigine  mg along with regular lamotrigine 200 mg daily.  Also takes Tegretol- mg twice a day.  He is also trying to get adequate sleep at night now.    Last VIisit- since the last visit patient has had 1  seizure this past Friday (sept 11)  he was extremely sleep deprived as he was awake the whole night prior to that taking care of his mother who has recently been diagnosed with dementia.  With this episode he felt an aura of paranoia followed by partial loss of awareness where he felt he was repeating  his movements followed by his hearing getting loud.  He continues to take Tegretol- mg twice daily and lamotrigine ER to 50 mg in the morning along with regular lamotrigine 200 mg.  His last levels of Tegretol were 6 (normal 4-12) and lamotrigine was 4.4 (2-20).  I also interrogated his VNS today and his settings were as follows out put current 1.5, frequency 20 Hz, pulse width 250, on time 7 seconds, off time 0.5 minutes.  Magnet mode current was 1.75 with pulse width of 250 and on time of 30 seconds.  We did not change his settings.    Last visit- since the last visit patient states that he has had a few episodes which he attributes to stress at work (works at [employer]).  One episode was during sleep witnessed by his  when he was sleeping on the couch when he suddenly sat straight up and tried scooting down.  Was also trying to reach for objects.  This lasted for a minute and he went back to sleep later.  He has also had 2-3 episodes at work when he feels it coming on ( as the sounds around him get louder)  but does not lose awareness.  He either swipes his magnet or takes an extra dose of his medication at that time.  Using his VNS magnet still causes slight pain in his cheek.  In addition patient has also started to have auditory hallucinations where he hears the sound of crickets and bugs.  This is pulsatile in nature and coincides with the beating of his heart at times.    Last visit-patient comes in today accompanied by his partner.  He has not had any seizures since he last saw me.  At his last visit I had increased his dose of lamotrigine  mg daily.  He also continues to take regular  Lamictal 200 mg daily in addition to Tegretol- mg daily.  We interrogated his VNS today and the seettings were as follows- output current 1.5, frequency 20 Hz, pulse width 250, on time 7 seconds, off time 0.5 minutes.  Magnet mode current was 1.75 with pulse width of 250 and on time of 30 seconds.  We did not change the settings.  Patient has been swiping the magnet at least a few times each month.    Last visit-this is a patient previously seen by Dr. Guerra for seizures.  As per patient he started having seizures at age 12.  With his events he has a paranoid feeling with sounds getting loud followed by loss of awareness where he stares off into space.  He has also had repetitive right arm movements with some of his seizures.  Denies any episodes with whole body shaking.  He had an EEG with  in 2004 that showed bitemporal slow wave activity more prominent on the left and a video EEG that showed probable frontal lobe onset.  He has tried several medications in the past including phenobarbital, phenytoin, Topamax and Keppra.  Currently takes Tegretol- mg in the morning and lamotrigine 200 mg along with lamotrigine ER 50 mg, 4 tablets daily.  Was supposed to take twice daily doses for both of them but takes just the morning dose.  His last levels in 2018 were-Lamictal-7, Tegretol-6.2.  Since his last visit in December he has had 2 seizures with one seizure being last week.  He also had a VNS placed in 2013.  Patient still gets hoarseness of voice while using the magnet.  We interrogated the VNS today.  Settings were as follows- output current 1.5, frequency 20 Hz, pulse width 250, on time 7 seconds, off time 0.5 minutes.  Magnet mode current was 1.75 with pulse width of 250 and on time of 60 seconds.  We reduced the magnet on time to 30 seconds.  We have also advised him to swipe the magnet at least once a month.  Of note-I reviewed Dr. Guerra's notes as follows-    Patient followed for  several years for epilepsy starting around age 12. Has had evaluation by Dr. Klein at  including EEG in 2004 showing bitemporal focal slow wave activity more prominent on the left. VEEG showed probable frontal lobe onset. Seizures start with paranoid feeling, then everything sounds loud, then sometimes body movement. Aware with some and not others. Recalls one where pushed back against couch, and right arm extended up in air. Usually arms or trunk or whole body movement; used to grit teeth. Has some where whole body tenses up, exhausted afterwards. Many in sleep.       Also has common migraines.  Has used PHB, PHT, Tegretol-XR and generic carbamazepine XR, has not tolerated TPM or LVT.  9/13 started LTG XR to total dose 400mg am. Reported no side effects on LTG ER but prefers taking LTG 200mg bid and LTG ER 50mg 4 qam.  Reported best month probably 5-6 seizures while awake (more in sleep), in a bad month -- many.     model placed 5/15. Noted no benefit, so 2/16 changed to rapid cycling, off time 1.1min, on time to 7 seconds.   9/16 Past couple months just total of 2-3 while awake (estimates 30% better), but many at night. Still neck pain with magnet.  Decreased off time to 0.8 minutes, no change on time of 7 seconds or current of 1.5mamp.   Decreased magnet PW to 250 and tolerated that well in the office.   10/17: Seizures pretty good overall -- 2-3 daytime sz in over 3 months, not many nocturnal either. Painful twinge in neck with magnet , so decreased to 130, increased magnet on time back to 60 seconds. Normal mode 1.5m amp, , on 7 sec off 0.8 minutes.  5/18: has been using the magnet to get used to it, and more tolerable. Magnet usually but does not always work. Increased magnet to 250PW.   8/18: doing well, one seizure he can think of in past couple mos. Changed dosing after forgot afternoon doses one day: taking only morning meds: CBZ XR 200mg 2 tabs, LTG 200mg and LTG ER 50mgx4. When he  gets home from work, if feels sz coming, will take an extra CBZ XR, tolerates it well, and uses magnet if it continues. Believes no increase in night time szs. Using the magnet, still gets twinge and eye runs, but tolerable.   Today: feels near seizures in evenings eg 3-4x/mo (phi around Black Friday), takes extra CBZ pill, no LOC. No missed days of work. No known nocturnal seizures. Magnet use still causes cough, mild twinge. Took dose of meds about 6:30 this am.   No headaches lately. Has a cold, o/w well.    The following portions of the patient's history were reviewed today and updated as of 12/09/2019  : allergies, current medications, past family history, past medical history, past social history, past surgical history and problem list  These document will be scanned to patient's chart.      Current Outpatient Medications:     amLODIPine (NORVASC) 5 MG tablet, Take 1 tablet by mouth Daily., Disp: , Rfl:     aspirin 81 MG EC tablet, Take 1 tablet by mouth Daily., Disp: , Rfl:     carBAMazepine XR (TEGretol  XR) 200 MG 12 hr tablet, TAKE 3 TABLETS BY MOUTH TWICE DAILY, Disp: 540 tablet, Rfl: 3    Cyanocobalamin (VITAMIN B 12 PO), Take  by mouth., Disp: , Rfl:     fluticasone (FLONASE) 50 MCG/ACT nasal spray, , Disp: , Rfl:     ibuprofen (ADVIL,MOTRIN) 600 MG tablet, Take 1 tablet by mouth Every 6 (Six) Hours As Needed for Mild Pain., Disp: , Rfl:     lamoTRIgine (LaMICtal) 200 MG tablet, Take 1 tablet by mouth Daily., Disp: 90 tablet, Rfl: 3    lamoTRIgine  MG tablet sustained-release 24 hour, Take 1 tablet by mouth Daily., Disp: 90 tablet, Rfl: 3    silodosin (RAPAFLO) 4 MG capsule capsule, , Disp: , Rfl:     SUMAtriptan (IMITREX) 100 MG tablet, Take 1 tablet by mouth 1 (One) Time As Needed for Migraine for up to 1 dose., Disp: 9 tablet, Rfl: 11    meclizine (ANTIVERT) 12.5 MG tablet, Take 1 tablet by mouth 3 (Three) Times a Day As Needed for Dizziness., Disp: 30 tablet, Rfl: 3   Past Medical History:  "  Diagnosis Date    Complex partial seizure     Headache, migraine     Headache, tension-type Aug. 2022    HL (hearing loss) 1994    Vision loss 2000      History reviewed. No pertinent surgical history.   Family History   Problem Relation Age of Onset    Alzheimer's disease Other     Dementia Other     Cancer Other         aunt, uncle    Dementia Mother     Stroke Mother       Social History     Socioeconomic History    Marital status:    Tobacco Use    Smoking status: Every Day     Current packs/day: 1.00     Average packs/day: 1 pack/day for 45.0 years (45.0 ttl pk-yrs)     Types: Cigarettes     Start date: 7/8/1980     Passive exposure: Current    Smokeless tobacco: Never   Vaping Use    Vaping status: Never Used   Substance and Sexual Activity    Alcohol use: No    Drug use: Never    Sexual activity: Not Currently     Partners: Male     Birth control/protection: Condom, None, Partner of same sex     Review of Systems   Neurological: Positive for dizziness.   All other systems reviewed and are negative.      Objective:    /92   Pulse 66   Ht 172.7 cm (68\")   Wt 86.1 kg (189 lb 12.8 oz)   SpO2 95%   BMI 28.86 kg/m²     Neurology Exam: Reviewed and remains unchanged    General apperance: NAD.     Mental status: Alert, awake and oriented to time place and person.    Recent and Remote memory: Intact.    Attention span and Concentration: Normal.     Language and Speech: Intact- No dysarthria.    Fluency, Naming , Repitition and Comprehension:  Intact    Cranial Nerves:   CN II: Visual fields are full. Intact. Fundi - Normal, No papillederma, Pupils - ISAAC  CN III, IV and VI: Extraocular movements are intact. Normal saccades.  No nystagmus noted  CN V: Facial sensation is intact.   CN VII: Muscles of facial expression reveal no asymmetry. Intact.   CN VIII: Hearing is intact. Whispered voice intact.   CN IX and X: Palate elevates symmetrically. Intact  CN XI: Shoulder shrug is intact.   CN XII: " Tongue is midline without evidence of atrophy or fasciculation.     Ophthalmoscopic exam of optic disc-normal    Motor:  Right UE muscle strength 5/5. Normal tone.     Left UE muscle strength 5/5. Normal tone.      Right LE muscle strength5/5. Normal tone.     Left LE muscle strength 5/5. Normal tone.      Sensory: Normal light touch, vibration and pinprick sensation bilaterally.    DTRs: 2+ bilaterally in upper and lower extremities.    Babinski: Negative bilaterally.    Co-ordination: Normal finger-to-nose, heel to shin B/L.    Rhomberg: Negative.    Gait: Normal.    Cardiovascular: Regular rate and rhythm without murmur, gallop or rub.    Assessment and Plan:  1. Partial symptomatic epilepsy with complex partial seizures, intractable, without status epilepticus (CMS/HCC)  Patient most likely has focal seizures from the left hemisphere  I have told him to continue Tegretol-XR  600 mg twice a day and lamotrigine 200 mg daily along with lamotrigine  mg daily  I interrogated his VNS.  Will refer him to neurosurgery for VNS battery change  Counseled on seizure precautions.  We will try to find him a neurologist to see in [place]    2.  BPPV  The episodes that he is describing could most likely be BPPV  I will prescribe him meclizine 12.5 mg to take as needed  If they recur again I may send him for vestibular therapy  He should also keep himself well-hydrated    3.  Hypertension  His blood pressure is elevated today.  I have told him to monitor his blood pressure regularly at home    Of note-his mild cognitive issues could be due to longstanding history of seizures and seizure medications.  I have told him to carry out both physical and mental exercises such as reading, solving crossword puzzles etc.    Return in about 6 months (around 12/30/2025).     I spent 25 minutes out of it 20 minutes was spent face-to-face  Poppy Olivares MD

## 2025-07-01 ENCOUNTER — TELEPHONE (OUTPATIENT)
Dept: NEUROLOGY | Facility: CLINIC | Age: 60
End: 2025-07-01
Payer: COMMERCIAL

## 2025-07-01 DIAGNOSIS — Z96.89 S/P PLACEMENT OF VNS (VAGUS NERVE STIMULATION) DEVICE: ICD-10-CM

## 2025-07-01 DIAGNOSIS — G40.219 PARTIAL SYMPTOMATIC EPILEPSY WITH COMPLEX PARTIAL SEIZURES, INTRACTABLE, WITHOUT STATUS EPILEPTICUS: Primary | ICD-10-CM

## 2025-07-01 NOTE — TELEPHONE ENCOUNTER
CALLED PATIENT TO LET HIM KNOW THAT DR GE PUT IN THE REFERRAL FOR NEUROLOGY WITH [facility] WITH DR TOTH - THERE IS CURRENTLY A 1.5 YEARS WAIT LIST AT THAT PRACTICE - DR GE SAID THAT SHE WOULD CONTINUE TO SEE HIM UNTIL HE IS SCHEDULED THERE AND CAN DO VIDEO VISITS - LEFT VOICEMAIL FOR CALLBACK

## 2025-08-27 ENCOUNTER — OFFICE VISIT (OUTPATIENT)
Dept: NEUROSURGERY | Facility: CLINIC | Age: 60
End: 2025-08-27
Payer: COMMERCIAL

## 2025-08-27 ENCOUNTER — PREP FOR SURGERY (OUTPATIENT)
Dept: OTHER | Facility: HOSPITAL | Age: 60
End: 2025-08-27
Payer: COMMERCIAL

## 2025-08-27 VITALS — HEIGHT: 68 IN | BODY MASS INDEX: 27.89 KG/M2 | TEMPERATURE: 98.2 F | WEIGHT: 184 LBS

## 2025-08-27 DIAGNOSIS — Z45.42 BATTERY END OF LIFE OF VAGUS NERVE STIMULATOR: Primary | ICD-10-CM

## 2025-08-27 DIAGNOSIS — G40.019 LOCALIZATION-RELATED IDIOPATHIC EPILEPSY AND EPILEPTIC SYNDROMES WITH SEIZURES OF LOCALIZED ONSET, INTRACTABLE, WITHOUT STATUS EPILEPTICUS: Primary | ICD-10-CM

## 2025-08-27 PROCEDURE — 99204 OFFICE O/P NEW MOD 45 MIN: CPT | Performed by: NEUROLOGICAL SURGERY

## 2025-08-27 RX ORDER — MECLIZINE HYDROCHLORIDE 25 MG/1
25 TABLET ORAL EVERY 8 HOURS PRN
COMMUNITY

## 2025-08-27 RX ORDER — FAMOTIDINE 20 MG/1
20 TABLET, FILM COATED ORAL
OUTPATIENT
Start: 2025-08-27

## 2025-08-27 RX ORDER — CHLORHEXIDINE GLUCONATE 40 MG/ML
SOLUTION TOPICAL
Qty: 120 ML | Refills: 0 | Status: SHIPPED | OUTPATIENT
Start: 2025-08-27